# Patient Record
Sex: FEMALE | Race: BLACK OR AFRICAN AMERICAN | NOT HISPANIC OR LATINO | Employment: FULL TIME | URBAN - METROPOLITAN AREA
[De-identification: names, ages, dates, MRNs, and addresses within clinical notes are randomized per-mention and may not be internally consistent; named-entity substitution may affect disease eponyms.]

---

## 2023-03-22 LAB — URINE CULTURE: NORMAL

## 2023-10-01 PROBLEM — M25.559 HIP PAIN: Status: ACTIVE | Noted: 2023-10-01

## 2023-10-01 PROBLEM — O28.5 ABNORMAL CHROMOSOMAL AND GENETIC FINDING ON ANTENATAL SCREENING OF MOTHER: Status: ACTIVE | Noted: 2023-10-01

## 2023-10-01 PROBLEM — R06.02 SHORTNESS OF BREATH WITH PREGNANCY (HHS-HCC): Status: ACTIVE | Noted: 2023-10-01

## 2023-10-01 PROBLEM — R35.0 URINARY FREQUENCY: Status: ACTIVE | Noted: 2023-10-01

## 2023-10-01 PROBLEM — M54.9 BACK PAIN: Status: ACTIVE | Noted: 2023-10-01

## 2023-10-01 PROBLEM — N91.1 SECONDARY AMENORRHEA: Status: ACTIVE | Noted: 2023-10-01

## 2023-10-01 PROBLEM — K42.9 UMBILICAL HERNIA: Status: ACTIVE | Noted: 2023-10-01

## 2023-10-01 PROBLEM — Z98.891 STATUS POST CESAREAN SECTION: Status: ACTIVE | Noted: 2023-10-01

## 2023-10-01 PROBLEM — D64.9 ANEMIA: Status: ACTIVE | Noted: 2023-10-01

## 2023-10-01 PROBLEM — N89.8 VAGINAL LESION: Status: ACTIVE | Noted: 2023-10-01

## 2023-10-01 PROBLEM — O43.899: Status: ACTIVE | Noted: 2023-10-01

## 2023-10-01 PROBLEM — O34.29 UTERINE SCAR FROM PREVIOUS SURGERY AFFECTING PREGNANCY (HHS-HCC): Status: ACTIVE | Noted: 2023-10-01

## 2023-10-01 PROBLEM — R06.02 SOB (SHORTNESS OF BREATH) ON EXERTION: Status: ACTIVE | Noted: 2023-10-01

## 2023-10-01 PROBLEM — Z87.898 HISTORY OF DOMESTIC VIOLENCE: Status: ACTIVE | Noted: 2023-10-01

## 2023-10-01 PROBLEM — R45.89 DEPRESSED MOOD: Status: ACTIVE | Noted: 2023-10-01

## 2023-10-01 PROBLEM — R30.0 DYSURIA: Status: ACTIVE | Noted: 2023-10-01

## 2023-10-01 PROBLEM — N93.9 VAGINAL BLEEDING: Status: ACTIVE | Noted: 2023-10-01

## 2023-10-01 PROBLEM — O26.899 SHORTNESS OF BREATH WITH PREGNANCY (HHS-HCC): Status: ACTIVE | Noted: 2023-10-01

## 2023-10-01 PROBLEM — F41.9 ANXIETY: Status: ACTIVE | Noted: 2023-10-01

## 2023-10-01 PROBLEM — R80.9 PROTEINURIA: Status: ACTIVE | Noted: 2023-10-01

## 2023-10-01 PROBLEM — N90.89 VULVAR LESION: Status: ACTIVE | Noted: 2023-10-01

## 2023-10-01 PROBLEM — M54.16 LUMBAR RADICULOPATHY, RIGHT: Status: ACTIVE | Noted: 2023-10-01

## 2023-10-01 PROBLEM — N89.8 VAGINAL IRRITATION: Status: ACTIVE | Noted: 2023-10-01

## 2023-10-01 RX ORDER — ACETAMINOPHEN AND CODEINE PHOSPHATE 300; 15 MG/1; MG/1
1 TABLET ORAL EVERY 6 HOURS PRN
COMMUNITY
Start: 2021-02-12 | End: 2023-10-16

## 2023-10-01 RX ORDER — DESOGESTREL AND ETHINYL ESTRADIOL 21-5 (28)
1 KIT ORAL DAILY
COMMUNITY
Start: 2022-08-11 | End: 2023-10-16

## 2023-10-01 RX ORDER — NORETHINDRONE 0.35 MG/1
1 TABLET ORAL DAILY
COMMUNITY
Start: 2020-06-06 | End: 2023-10-16

## 2023-10-03 ENCOUNTER — TELEMEDICINE (OUTPATIENT)
Dept: BEHAVIORAL HEALTH | Facility: CLINIC | Age: 33
End: 2023-10-03
Payer: COMMERCIAL

## 2023-10-03 DIAGNOSIS — F43.0 REACTION, STRESS, ACUTE: ICD-10-CM

## 2023-10-03 DIAGNOSIS — F41.9 ANXIETY: ICD-10-CM

## 2023-10-03 PROCEDURE — 90847 FAMILY PSYTX W/PT 50 MIN: CPT | Performed by: COUNSELOR
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
## 2023-10-10 ENCOUNTER — APPOINTMENT (OUTPATIENT)
Dept: BEHAVIORAL HEALTH | Facility: CLINIC | Age: 33
End: 2023-10-10
Payer: COMMERCIAL

## 2023-10-16 ENCOUNTER — OFFICE VISIT (OUTPATIENT)
Dept: PRIMARY CARE | Facility: CLINIC | Age: 33
End: 2023-10-16
Payer: COMMERCIAL

## 2023-10-16 VITALS
HEART RATE: 81 BPM | SYSTOLIC BLOOD PRESSURE: 110 MMHG | HEIGHT: 67 IN | DIASTOLIC BLOOD PRESSURE: 66 MMHG | OXYGEN SATURATION: 99 % | BODY MASS INDEX: 19.71 KG/M2 | WEIGHT: 125.6 LBS

## 2023-10-16 DIAGNOSIS — F51.04 CHRONIC INSOMNIA: ICD-10-CM

## 2023-10-16 DIAGNOSIS — R07.89 ATYPICAL CHEST PAIN: Primary | ICD-10-CM

## 2023-10-16 PROCEDURE — 99204 OFFICE O/P NEW MOD 45 MIN: CPT | Performed by: STUDENT IN AN ORGANIZED HEALTH CARE EDUCATION/TRAINING PROGRAM

## 2023-10-16 PROCEDURE — 1036F TOBACCO NON-USER: CPT | Performed by: STUDENT IN AN ORGANIZED HEALTH CARE EDUCATION/TRAINING PROGRAM

## 2023-10-16 PROCEDURE — 93000 ELECTROCARDIOGRAM COMPLETE: CPT | Performed by: STUDENT IN AN ORGANIZED HEALTH CARE EDUCATION/TRAINING PROGRAM

## 2023-10-16 NOTE — PROGRESS NOTES
"Subjective   Patient ID: Jd Witt is a 33 y.o. female who presents for New Patient Visit (Chest pains. Pt declined flu shot. ).        HPI    Est care   Pt's PMH, PSH, SH, FH , meds and allergies was obtained / reviewed and updated .    Her maternal Gm and GM sis passed away recently and has been experiencing  more CP since then . CP even prior.   Tightness on right sided chest , lasts 1-3days   Not related to exertion  Nausea may or may not be present with CP       LMP 10/13   Not currently on any birth control   Dad reportedly  of heart attack at age 40 , aneurysm in his 20s   Sister - aneurysm in her 20s     Denies any recreational drug use  Has 3 children 7 y/o twin boys and 3 y/o daughter , father is partially involved   Works full time ,    Not a cigarette smoker  no alcohol use     Chronic sleep deprivation   Sleeps 3-4 hrs every night for the last 2 years   Some days she sleeps for 9 hrs like this past      In therapy once a week .     Visit Vitals  /66   Pulse 81   Ht 1.702 m (5' 7\")   Wt 57 kg (125 lb 9.6 oz)   LMP 10/13/2023   SpO2 99%   BMI 19.67 kg/m²   OB Status Having periods   Smoking Status Never   BSA 1.64 m²      Patient's last menstrual period was 10/13/2023.     Review of Systems    Constitutional : No feeling poorly / fevers/ chills / night sweats/ fatigue   Cardiovascular : No CP /Palpitations/ lower extremity edema / syncope   Respiratory : No Cough /MONTAÑO/Dyspnea at rest   Gastrointestinal : No abd pain / N/V  No bloody stools/ melena / constipation  Endo : No polyuria/polydipsia/ muscle weakness / sluggishness   CNS: No confusion / HA/ tingling/ numbness/ weakness of extremities  Psychiatric: No anxiety/ depression/ SI/HI    All other systems have been reviewed and are negative for complaint       Physical Exam    Constitutional : Vitals reviewed. Alert and in no distress  Cardiovascular : RRR, Normal S1, S2, No pericardial rub/ gallop, no peripheral " edema   Pulmonary: No respiratory distress, CTAB   MSK : Normal gait and station , strength and tone     Neurologic : CNs 2-12 grossly intact , no obvious FNDs  Psych : A,Ox3, normal mood and affect      Assessment/Plan   Diagnoses and all orders for this visit:  Atypical chest pain  -     ECG 12 lead (Clinic Performed)  -     CBC; Future  -     Comprehensive Metabolic Panel; Future  -     Hemoglobin A1C; Future  -     Lipid Panel; Future  -     TSH with reflex to Free T4 if abnormal; Future  Chronic insomnia  -     Referral to Sleep Psychology; Future       Atypical chest pain likely from acute stress in her life from everyday life   Possibility of GERD discussed as well   Cardiac risk factors none at this time   EKG ; NSR   Chronic insomnia : sleep Psychology   Life stressors : in therapy once a week   Not desiring medical mgmt at this time         Conditions addressed and mgmt as noted above.  Pertinent labs, images/ imaging reports , chart review was done .   Age appropriate labs / labs for mgmt of chronic medical conditions ordered, further mgmt pending the results.

## 2023-10-17 ENCOUNTER — APPOINTMENT (OUTPATIENT)
Dept: BEHAVIORAL HEALTH | Facility: CLINIC | Age: 33
End: 2023-10-17
Payer: COMMERCIAL

## 2023-10-17 ENCOUNTER — OFFICE VISIT (OUTPATIENT)
Dept: OBSTETRICS AND GYNECOLOGY | Facility: CLINIC | Age: 33
End: 2023-10-17
Payer: COMMERCIAL

## 2023-10-17 VITALS
BODY MASS INDEX: 19.62 KG/M2 | WEIGHT: 125 LBS | SYSTOLIC BLOOD PRESSURE: 112 MMHG | DIASTOLIC BLOOD PRESSURE: 71 MMHG | HEART RATE: 74 BPM | HEIGHT: 67 IN

## 2023-10-17 DIAGNOSIS — Z30.013 ENCOUNTER FOR INITIAL PRESCRIPTION OF INJECTABLE CONTRACEPTIVE: ICD-10-CM

## 2023-10-17 DIAGNOSIS — Z01.419 ENCNTR FOR GYN EXAM (GENERAL) (ROUTINE) W/O ABN FINDINGS: Primary | ICD-10-CM

## 2023-10-17 DIAGNOSIS — Z11.3 ROUTINE SCREENING FOR STI (SEXUALLY TRANSMITTED INFECTION): ICD-10-CM

## 2023-10-17 PROBLEM — O28.5 ABNORMAL CHROMOSOMAL AND GENETIC FINDING ON ANTENATAL SCREENING OF MOTHER: Status: RESOLVED | Noted: 2023-10-01 | Resolved: 2023-10-17

## 2023-10-17 PROBLEM — M25.559 HIP PAIN: Status: RESOLVED | Noted: 2023-10-01 | Resolved: 2023-10-17

## 2023-10-17 PROBLEM — N91.1 SECONDARY AMENORRHEA: Status: RESOLVED | Noted: 2023-10-01 | Resolved: 2023-10-17

## 2023-10-17 PROBLEM — N89.8 VAGINAL IRRITATION: Status: RESOLVED | Noted: 2023-10-01 | Resolved: 2023-10-17

## 2023-10-17 PROBLEM — R06.02 SOB (SHORTNESS OF BREATH) ON EXERTION: Status: RESOLVED | Noted: 2023-10-01 | Resolved: 2023-10-17

## 2023-10-17 PROBLEM — M54.9 BACK PAIN: Status: RESOLVED | Noted: 2023-10-01 | Resolved: 2023-10-17

## 2023-10-17 PROBLEM — R80.9 PROTEINURIA: Status: RESOLVED | Noted: 2023-10-01 | Resolved: 2023-10-17

## 2023-10-17 PROBLEM — O43.899: Status: RESOLVED | Noted: 2023-10-01 | Resolved: 2023-10-17

## 2023-10-17 PROBLEM — R30.0 DYSURIA: Status: RESOLVED | Noted: 2023-10-01 | Resolved: 2023-10-17

## 2023-10-17 PROBLEM — O34.29 UTERINE SCAR FROM PREVIOUS SURGERY AFFECTING PREGNANCY (HHS-HCC): Status: RESOLVED | Noted: 2023-10-01 | Resolved: 2023-10-17

## 2023-10-17 PROBLEM — Z98.891 STATUS POST CESAREAN SECTION: Status: RESOLVED | Noted: 2023-10-01 | Resolved: 2023-10-17

## 2023-10-17 PROBLEM — N93.9 VAGINAL BLEEDING: Status: RESOLVED | Noted: 2023-10-01 | Resolved: 2023-10-17

## 2023-10-17 PROBLEM — N89.8 VAGINAL LESION: Status: RESOLVED | Noted: 2023-10-01 | Resolved: 2023-10-17

## 2023-10-17 PROBLEM — N90.89 VULVAR LESION: Status: RESOLVED | Noted: 2023-10-01 | Resolved: 2023-10-17

## 2023-10-17 PROBLEM — M54.16 LUMBAR RADICULOPATHY, RIGHT: Status: RESOLVED | Noted: 2023-10-01 | Resolved: 2023-10-17

## 2023-10-17 PROBLEM — R06.02 SHORTNESS OF BREATH WITH PREGNANCY (HHS-HCC): Status: RESOLVED | Noted: 2023-10-01 | Resolved: 2023-10-17

## 2023-10-17 PROBLEM — R35.0 URINARY FREQUENCY: Status: RESOLVED | Noted: 2023-10-01 | Resolved: 2023-10-17

## 2023-10-17 PROBLEM — K42.9 UMBILICAL HERNIA: Status: RESOLVED | Noted: 2023-10-01 | Resolved: 2023-10-17

## 2023-10-17 PROBLEM — O26.899 SHORTNESS OF BREATH WITH PREGNANCY (HHS-HCC): Status: RESOLVED | Noted: 2023-10-01 | Resolved: 2023-10-17

## 2023-10-17 LAB — PREGNANCY TEST URINE, POC: NEGATIVE

## 2023-10-17 PROCEDURE — 81025 URINE PREGNANCY TEST: CPT | Performed by: NURSE PRACTITIONER

## 2023-10-17 PROCEDURE — 99385 PREV VISIT NEW AGE 18-39: CPT | Performed by: NURSE PRACTITIONER

## 2023-10-17 PROCEDURE — 1036F TOBACCO NON-USER: CPT | Performed by: NURSE PRACTITIONER

## 2023-10-17 PROCEDURE — 2500000004 HC RX 250 GENERAL PHARMACY W/ HCPCS (ALT 636 FOR OP/ED): Performed by: NURSE PRACTITIONER

## 2023-10-17 PROCEDURE — 99395 PREV VISIT EST AGE 18-39: CPT | Performed by: NURSE PRACTITIONER

## 2023-10-17 RX ORDER — MEDROXYPROGESTERONE ACETATE 150 MG/ML
150 INJECTION, SUSPENSION INTRAMUSCULAR
Status: SHIPPED | OUTPATIENT
Start: 2023-10-17 | End: 2024-10-11

## 2023-10-17 RX ADMIN — MEDROXYPROGESTERONE ACETATE 150 MG: 150 INJECTION, SUSPENSION INTRAMUSCULAR at 14:59

## 2023-10-17 ASSESSMENT — ENCOUNTER SYMPTOMS
ALLERGIC/IMMUNOLOGIC NEGATIVE: 0
CONSTITUTIONAL NEGATIVE: 1
NEUROLOGICAL NEGATIVE: 0
LOSS OF SENSATION IN FEET: 0
RESPIRATORY NEGATIVE: 0
CARDIOVASCULAR NEGATIVE: 0
EYES NEGATIVE: 1
PSYCHIATRIC NEGATIVE: 1
OCCASIONAL FEELINGS OF UNSTEADINESS: 0
ENDOCRINE NEGATIVE: 0
HEMATOLOGIC/LYMPHATIC NEGATIVE: 0
GASTROINTESTINAL NEGATIVE: 0
MUSCULOSKELETAL NEGATIVE: 0
DEPRESSION: 0

## 2023-10-17 ASSESSMENT — COLUMBIA-SUICIDE SEVERITY RATING SCALE - C-SSRS
1. IN THE PAST MONTH, HAVE YOU WISHED YOU WERE DEAD OR WISHED YOU COULD GO TO SLEEP AND NOT WAKE UP?: NO
2. HAVE YOU ACTUALLY HAD ANY THOUGHTS OF KILLING YOURSELF?: NO
6. HAVE YOU EVER DONE ANYTHING, STARTED TO DO ANYTHING, OR PREPARED TO DO ANYTHING TO END YOUR LIFE?: NO

## 2023-10-17 ASSESSMENT — PATIENT HEALTH QUESTIONNAIRE - PHQ9
2. FEELING DOWN, DEPRESSED OR HOPELESS: NOT AT ALL
1. LITTLE INTEREST OR PLEASURE IN DOING THINGS: SEVERAL DAYS
SUM OF ALL RESPONSES TO PHQ9 QUESTIONS 1 AND 2: 1

## 2023-10-17 ASSESSMENT — PAIN SCALES - GENERAL: PAINLEVEL: 0-NO PAIN

## 2023-10-17 NOTE — PROGRESS NOTES
"Jd Witt is a 33 y.o. who presents today for her annual gynecologic exam with complaints needing contraception    Concerns with intercourse:  no     Last pap:   2019 Normal HPV Negative  History of abnormal pap: no  HPV vaccine: no  Last mammogram: Never  Colon screen: Never    History of STIs: yes - trich     Patient concern for STI: yes    Family history of breast, uterine, ovarian or colon cancer: no     Past medical, surgical, family and social histories reviewed and updated as needed.    /71   Pulse 74   Ht 1.702 m (5' 7\")   Wt 56.7 kg (125 lb)   LMP 10/14/2023   BMI 19.58 kg/m²      OBGyn Exam       Diagnoses and all orders for this visit:  Encntr for gyn exam (general) (routine) w/o abn findings  Routine screening for STI (sexually transmitted infection)  -     Hepatitis B Surface Antigen; Future  -     Hepatitis C Antibody; Future  -     C. Trachomatis / N. Gonorrhoeae, Amplified Detection  -     TRICH VAGINALIS, AMPLIFIED  -     HIV 1/2 Antigen/Antibody Screen with Reflex to Confirmation; Future  -     Syphilis Screen with Reflex; Future  Encounter for initial prescription of injectable contraceptive  -     POC pregnancy, urine  -     medroxyPROGESTERone (Depo-Provera) injection 150 mg    "

## 2023-10-19 ENCOUNTER — TELEPHONE (OUTPATIENT)
Dept: OBSTETRICS AND GYNECOLOGY | Facility: CLINIC | Age: 33
End: 2023-10-19
Payer: COMMERCIAL

## 2023-10-19 DIAGNOSIS — Z11.3 SCREENING FOR STDS (SEXUALLY TRANSMITTED DISEASES): Primary | ICD-10-CM

## 2023-10-19 NOTE — TELEPHONE ENCOUNTER
Left patient a message. Needs to repeat GC/CT and trich. Lab cancelled the orders, pended orders sent to Nuris Camargo CNP to sign.

## 2023-10-24 ENCOUNTER — APPOINTMENT (OUTPATIENT)
Dept: BEHAVIORAL HEALTH | Facility: CLINIC | Age: 33
End: 2023-10-24
Payer: COMMERCIAL

## 2023-10-31 ENCOUNTER — TELEMEDICINE (OUTPATIENT)
Dept: BEHAVIORAL HEALTH | Facility: CLINIC | Age: 33
End: 2023-10-31
Payer: COMMERCIAL

## 2023-10-31 DIAGNOSIS — F51.04 CHRONIC INSOMNIA: ICD-10-CM

## 2024-01-08 ENCOUNTER — CLINICAL SUPPORT (OUTPATIENT)
Dept: OBSTETRICS AND GYNECOLOGY | Facility: CLINIC | Age: 34
End: 2024-01-08
Payer: MEDICARE

## 2024-01-08 DIAGNOSIS — Z30.019 ENCOUNTER FOR FEMALE BIRTH CONTROL: ICD-10-CM

## 2024-01-08 PROCEDURE — 96372 THER/PROPH/DIAG INJ SC/IM: CPT | Performed by: NURSE PRACTITIONER

## 2024-01-08 PROCEDURE — 2500000004 HC RX 250 GENERAL PHARMACY W/ HCPCS (ALT 636 FOR OP/ED): Performed by: NURSE PRACTITIONER

## 2024-01-08 RX ADMIN — MEDROXYPROGESTERONE ACETATE 150 MG: 150 INJECTION, SUSPENSION INTRAMUSCULAR at 10:15

## 2024-01-08 NOTE — PROGRESS NOTES
Patient here for her second deprovera injection, 150mg given IM to right gluteal region, tolerated well. Given calendar for her next 12 week injection.  Patient having bleeding. Message sent to Dr. Guzmán.

## 2024-01-23 ENCOUNTER — TELEMEDICINE (OUTPATIENT)
Dept: BEHAVIORAL HEALTH | Facility: CLINIC | Age: 34
End: 2024-01-23
Payer: MEDICARE

## 2024-01-23 DIAGNOSIS — F41.9 ANXIETY: ICD-10-CM

## 2024-01-23 PROCEDURE — 90834 PSYTX W PT 45 MINUTES: CPT | Performed by: COUNSELOR

## 2024-01-23 NOTE — PROGRESS NOTES
"Start time: 3:10 pm  End time: 4:00 pm  Total time:50 minutes  Telehealth visit. PT consented  Last visit: 10/3/2024  Diagnosis: F41.9  PT returns to therapy reporting feeling \"good, accomplished.\"  She and her 3 children successfully moved into their new home on 10/19. She is starting to paint and remodel and smiled as she shared her pride/love for her home. Said she is teaching her children to be more responsible, \"its still stressful.\"  Provider joined, celebrated success.  We processed reported stressors: 1) relationship with Ady.  2) Holidays were difficult without her grandmother. 3)  care taking the children's grandmother who was diagnosed with cancer. 4) Work stress. Her performance evaluation rating was 2 (below average).  Provider offered support. Used CBT and supportive therapy. PT evidenced improved ability to identify her thinking errors as well as to recognize where she can problem solve or let go. She struggles with perspective taking and empathy, we will continue to explore. We will discuss treatment goal setting for 2024 in the upcoming visit. F/U 2 -3 weeks.    "

## 2024-01-25 ENCOUNTER — OFFICE VISIT (OUTPATIENT)
Dept: SLEEP MEDICINE | Facility: CLINIC | Age: 34
End: 2024-01-25
Payer: MEDICARE

## 2024-01-25 DIAGNOSIS — G47.00 INSOMNIA, UNSPECIFIED TYPE: Primary | ICD-10-CM

## 2024-01-25 DIAGNOSIS — F41.9 ANXIETY: ICD-10-CM

## 2024-01-25 DIAGNOSIS — Z56.6 WORK-RELATED STRESS: ICD-10-CM

## 2024-01-25 DIAGNOSIS — Z65.8 PSYCHOSOCIAL STRESSORS: ICD-10-CM

## 2024-01-25 DIAGNOSIS — Z72.821 INADEQUATE SLEEP HYGIENE: ICD-10-CM

## 2024-01-25 DIAGNOSIS — R45.89 DEPRESSED MOOD: ICD-10-CM

## 2024-01-25 DIAGNOSIS — G47.19 EXCESSIVE DAYTIME SLEEPINESS: ICD-10-CM

## 2024-01-25 DIAGNOSIS — G47.9 SLEEP DISTURBANCE: ICD-10-CM

## 2024-01-25 DIAGNOSIS — R53.83 FATIGUE, UNSPECIFIED TYPE: ICD-10-CM

## 2024-01-25 PROCEDURE — 1036F TOBACCO NON-USER: CPT

## 2024-01-25 PROCEDURE — 99204 OFFICE O/P NEW MOD 45 MIN: CPT

## 2024-01-25 SDOH — HEALTH STABILITY - MENTAL HEALTH: OTHER PHYSICAL AND MENTAL STRAIN RELATED TO WORK: Z56.6
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
## 2024-01-25 NOTE — LETTER
February 3, 2024     Sunita Young MD  [address]  [facility] [address]    Patient: Jd Witt   YOB: 1990   Date of Visit: 1/25/2024       Dear Dr. Sunita Young MD:    Thank you for referring Jd Witt to me for evaluation. Below are my notes for this consultation.  If you have questions, please do not hesitate to call me. I look forward to following your patient along with you.       Sincerely,     Jaclyn Liriano, APRN-CNP      CC: No Recipients  ______________________________________________________________________________________

## 2024-02-03 PROBLEM — Z65.8 PSYCHOSOCIAL STRESSORS: Status: ACTIVE | Noted: 2024-02-03

## 2024-02-03 PROBLEM — Z56.6 WORK-RELATED STRESS: Status: ACTIVE | Noted: 2024-02-03

## 2024-02-03 PROBLEM — Z72.821 INADEQUATE SLEEP HYGIENE: Status: ACTIVE | Noted: 2024-02-03

## 2024-02-03 PROBLEM — G47.9 SLEEP DISTURBANCE: Status: ACTIVE | Noted: 2024-02-03

## 2024-02-03 PROBLEM — G47.19 EXCESSIVE DAYTIME SLEEPINESS: Status: ACTIVE | Noted: 2024-02-03

## 2024-02-03 PROBLEM — R53.83 FATIGUE: Status: ACTIVE | Noted: 2024-02-03

## 2024-02-03 PROBLEM — G47.00 INSOMNIA: Status: ACTIVE | Noted: 2024-02-03

## 2024-02-04 NOTE — PATIENT INSTRUCTIONS
GOALS/OBJECTIVES/INTERVENTIONS:  1. Patient was provided psychoeducation on Cognitive Behavioral Therapy for insomnia including sleep restriction, sleep hygiene, stimulus control, and challenging cognitions related to sleep.   2. Patient was instructed to keep sleep logs, also tracking medication use and any additional factors that may have positively or negatively impacted sleep. Patient will keep sleep logs for the next 2 weeks to gain a better understanding of the exact nature of her sleep issues and further tailor CBT-I treatment to those concerns.   3. Continue with Psychologist / Therapist  4. Initial Sleep Plan include:            - Relaxation practice in the last hour before bed            - make a cut off time in the evening for work, no working late into the night  5. Patient scheduled for follow up in two weeks  6. Please email your sleep logs the DAY PRIOR to your appointment to [email or web address]    If sleep logs are not completed and submitted as requested, you will be asked to reschedule to another date and will need to ensure the sleep logs are submitted the day prior to your appointment. After two reschedules, you will be discontinued out of therapy.

## 2024-02-12 ENCOUNTER — TELEMEDICINE (OUTPATIENT)
Dept: BEHAVIORAL HEALTH | Facility: CLINIC | Age: 34
End: 2024-02-12
Payer: MEDICARE

## 2024-02-12 DIAGNOSIS — F33.1 MAJOR DEPRESSIVE DISORDER, RECURRENT EPISODE, MODERATE WITH ANXIOUS DISTRESS (MULTI): ICD-10-CM

## 2024-02-12 DIAGNOSIS — F41.9 ANXIETY: ICD-10-CM

## 2024-02-12 PROCEDURE — 90837 PSYTX W PT 60 MINUTES: CPT | Performed by: COUNSELOR

## 2024-02-12 PROCEDURE — 1036F TOBACCO NON-USER: CPT | Performed by: COUNSELOR
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
## 2024-02-27 ENCOUNTER — TELEMEDICINE (OUTPATIENT)
Dept: BEHAVIORAL HEALTH | Facility: CLINIC | Age: 34
End: 2024-02-27
Payer: MEDICARE

## 2024-02-27 DIAGNOSIS — F43.89 REACTION TO CHRONIC STRESS: ICD-10-CM

## 2024-02-27 DIAGNOSIS — F33.0 MILD RECURRENT MAJOR DEPRESSION (CMS-HCC): ICD-10-CM

## 2024-02-27 DIAGNOSIS — F41.9 ANXIETY: ICD-10-CM

## 2024-02-27 PROCEDURE — 90834 PSYTX W PT 45 MINUTES: CPT | Performed by: COUNSELOR

## 2024-02-27 PROCEDURE — 1036F TOBACCO NON-USER: CPT | Performed by: COUNSELOR
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
## 2024-03-05 ENCOUNTER — TELEMEDICINE (OUTPATIENT)
Dept: BEHAVIORAL HEALTH | Facility: CLINIC | Age: 34
End: 2024-03-05
Payer: MEDICARE

## 2024-03-05 DIAGNOSIS — F41.9 ANXIETY: ICD-10-CM

## 2024-03-05 DIAGNOSIS — F43.89 STRESS REACTION, CHRONIC: ICD-10-CM

## 2024-03-05 DIAGNOSIS — F33.0 MILD RECURRENT MAJOR DEPRESSION (CMS-HCC): ICD-10-CM

## 2024-03-05 PROCEDURE — 90837 PSYTX W PT 60 MINUTES: CPT | Performed by: COUNSELOR

## 2024-03-05 PROCEDURE — 1036F TOBACCO NON-USER: CPT | Performed by: COUNSELOR

## 2024-03-05 NOTE — PROGRESS NOTES
"Start time:  1:00 pm  End time: 2:00 pm  Total time: 60 minutes  Telehealth visit. PT consented  Last visit: 2/27/2024  Diagnosis: MDD, Anxiety, Chronic stress  Treatment goal: \"I want to be in peace and a gain a better handle on my emotions.\"    PT presented as calm. When provider asked what changed from last week pt said she got a break this weekend, her mother came into [facility] for her while she went to [place].  She rated the trip 8/10 (10 the best) noting moments of feeling very stressed and anxious, \"girlfriends' behavior was over the top.\"   She was triggered by several conversations, felt boundaries were crossed.   She wants to share her concerns with her friend but does not want to be disrespectful.  Jd was tearful as she shared happy and grateful feelings that her friends recognized her progress. T/A school issue involving her son. She notes being more mindful to self regulate to avoid reacting and although unresolved she thinks she is doing okay managing her thoughts/emotions concerning this situation.  Provider praised for taking the time to get away and have fun. Reflected on managing triggers and assessed her ability to identify thinking errors using CBT.  Engaged to teach more conflict resolution using I statements and active listening skills.    HW-  -continue to notice/track triggers   -dedicate 30 minutes daily to self care  -spend 5 minutes daily doing nothing  "

## 2024-07-19 ENCOUNTER — APPOINTMENT (OUTPATIENT)
Dept: OBSTETRICS AND GYNECOLOGY | Facility: CLINIC | Age: 34
End: 2024-07-19
Payer: MEDICARE

## 2024-10-29 DIAGNOSIS — Z32.01 PREGNANCY TEST POSITIVE (HHS-HCC): Primary | ICD-10-CM

## 2024-10-29 NOTE — PROGRESS NOTES
Please see MyChart message from today.  Early OB, scheduled for initial OB 12/3.    Requested Prescriptions     Pending Prescriptions Disp Refills    prenatal vitamin, iron-folic, 27 mg iron-800 mcg folic acid tablet 90 tablet 3     Sig: Take 1 tablet by mouth once daily.

## 2024-11-07 ENCOUNTER — OFFICE VISIT (OUTPATIENT)
Dept: OBSTETRICS AND GYNECOLOGY | Facility: CLINIC | Age: 34
End: 2024-11-07
Payer: MEDICARE

## 2024-11-07 VITALS — HEIGHT: 67 IN | BODY MASS INDEX: 21.35 KG/M2 | WEIGHT: 136 LBS

## 2024-11-07 DIAGNOSIS — O26.899 PELVIC PAIN AFFECTING PREGNANCY, ANTEPARTUM (HHS-HCC): Primary | ICD-10-CM

## 2024-11-07 DIAGNOSIS — R10.2 PELVIC PAIN AFFECTING PREGNANCY, ANTEPARTUM (HHS-HCC): Primary | ICD-10-CM

## 2024-11-07 DIAGNOSIS — N91.2 AMENORRHEA: ICD-10-CM

## 2024-11-07 DIAGNOSIS — R35.0 FREQUENT URINATION: ICD-10-CM

## 2024-11-07 LAB
BILIRUBIN, POC: NEGATIVE
BLOOD URINE, POC: NEGATIVE
CLARITY, POC: NORMAL
COLOR, POC: YELLOW
GLUCOSE URINE, POC: NEGATIVE
KETONES, POC: NEGATIVE
LEUKOCYTE EST, POC: NEGATIVE
NITRITE, POC: NEGATIVE
PH, POC: 6
PREGNANCY TEST URINE, POC: POSITIVE
SPECIFIC GRAVITY, POC: 1.01
URINE PROTEIN, POC: NEGATIVE
UROBILINOGEN, POC: 0.2

## 2024-11-07 PROCEDURE — 81002 URINALYSIS NONAUTO W/O SCOPE: CPT | Performed by: OBSTETRICS & GYNECOLOGY

## 2024-11-07 PROCEDURE — 99214 OFFICE O/P EST MOD 30 MIN: CPT | Performed by: OBSTETRICS & GYNECOLOGY

## 2024-11-07 PROCEDURE — 1036F TOBACCO NON-USER: CPT | Performed by: OBSTETRICS & GYNECOLOGY

## 2024-11-07 PROCEDURE — 81025 URINE PREGNANCY TEST: CPT | Performed by: OBSTETRICS & GYNECOLOGY

## 2024-11-07 PROCEDURE — 87086 URINE CULTURE/COLONY COUNT: CPT

## 2024-11-07 PROCEDURE — 3008F BODY MASS INDEX DOCD: CPT | Performed by: OBSTETRICS & GYNECOLOGY

## 2024-11-07 NOTE — PROGRESS NOTES
Jd Witt is a 34 y.o. female who presents with a chief complaint of Pelvic Pain (Patient complains she is having abnormal pelvic pain, no bleeding  and lower back pain./Patient had 2 positive pregnancy test at home , initial ob appointment is with  but is here today for possible uti and pelvic pain.)      SUBJECTIVE  Patient presents in early pregnancy complaining of pelvic pain and cramping.  She has a history of fibroids in the past.  She has had 2 another pregnancy with twins.  She denies any bleeding but is having a lot of pain and cramping.  She is about 6 weeks pregnant by her LMP.  We talked discussed getting an ultrasound just to check for an intrauterine pregnancy.    Past Medical History:   Diagnosis Date    Anemia complicating pregnancy, unspecified trimester ([facility]) 02/06/2017    Anemia in pregnancy    Encounter for contraceptive management, unspecified 09/18/2019    Contraception management    Encounter for screening for infections with a predominantly sexual mode of transmission 06/29/2016    Screening for STDs (sexually transmitted diseases)    Encounter for screening for infections with a predominantly sexual mode of transmission 10/29/2019    Screening for STDs (sexually transmitted diseases)    Encounter for supervision of normal first pregnancy, unspecified trimester ([facility]) 11/11/2016    Supervision of normal first pregnancy    Gestational (pregnancy-induced) hypertension without significant proteinuria, third trimester ([facility]) 02/03/2017    Gestational hypertension, third trimester    Gestational (pregnancy-induced) hypertension without significant proteinuria, unspecified trimester ([facility]) 02/06/2017    Gestational hypertension    Gestational (pregnancy-induced) hypertension without significant proteinuria, unspecified trimester ([facility]) 02/06/2017    Gestational hypertension    Other specified diseases and conditions complicating pregnancy ([facility]) 02/06/2017     Back pain during pregnancy    Personal history of other diseases of the female genital tract 2019    History of vaginal discharge    Personal history of other drug therapy 10/29/2019    History of influenza vaccination    Personal history of other specified conditions 10/29/2019    History of urinary frequency    Status post  section 10/01/2023    Twin pregnancy, dichorionic/diamniotic, unspecified trimester ([facility]) 2017    Twin pregnancy, twins dichorionic and diamniotic    Umbilical hernia 10/01/2023     Past Surgical History:   Procedure Laterality Date     SECTION, LOW TRANSVERSE  2017     Section Low Transverse     Social History     Socioeconomic History    Marital status: Single   Tobacco Use    Smoking status: Never    Smokeless tobacco: Never   Vaping Use    Vaping status: Never Used   Substance and Sexual Activity    Alcohol use: Not Currently    Drug use: Not Currently    Sexual activity: Yes     Partners: Male     Birth control/protection: Condom Male     Family History   Problem Relation Name Age of Onset    Other (cardiac arrhythmia) Father      Cerebral aneurysm Father      Cerebral aneurysm Sister      Diabetes Maternal Grandmother      Lung cancer Maternal Grandmother         OB History    Para Term  AB Living   2 2 2     3   SAB IAB Ectopic Multiple Live Births         1 3      # Outcome Date GA Lbr Fernie/2nd Weight Sex Type Anes PTL Lv   2 Term 20     Vag-Spont   GRACIELA   1A Term 17    M CS-LTranv   GRACIELA   1B Term 17    M CS-LVertical   GRACIELA       OBJECTIVE  No Known Allergies   (Not in a hospital admission)       Review of Systems  History obtained from the patient  General ROS: negative  Psychological ROS: negative  Gastrointestinal ROS: no abdominal pain, change in bowel habits, or black or bloody stools  Musculoskeletal ROS: negative  Physical Exam  General Appearance: awake, alert, oriented, in no acute distress, well  "developed, well nourished, and in no acute distress  Skin: there are no suspicious lesions or rashes of concern, skin color, texture, turgor are normal; there are no bruises, rashes or lesions.  Head/Face: NCAT  Eyes: No gross abnormalities., PERRL, and EOMI  Neck: neck- supple, no mass, non-tender  Back: no pain to palpation  Abdomen: Soft, non-tender, normal bowel sounds; no bruits, organomegaly or masses.  Extremities: Extremities warm to touch, pink, with no edema.  Musculoskeletal: negative    Ht 1.702 m (5' 7\")   Wt 61.7 kg (136 lb)   LMP 09/26/2024 (Approximate)   BMI 21.30 kg/m²    Problem List Items Addressed This Visit    None  Visit Diagnoses       Pelvic pain affecting pregnancy, antepartum ([facility])    -  Primary    Relevant Orders    US MAC OB imaging order    Frequent urination        Relevant Orders    POCT urinalysis dipstick manually resulted (Completed)    US MAC OB imaging order    Urine culture    Amenorrhea        Relevant Orders    POCT pregnancy, urine manually resulted (Completed)               "

## 2024-11-08 LAB — BACTERIA UR CULT: NORMAL

## 2024-11-11 ENCOUNTER — HOSPITAL ENCOUNTER (OUTPATIENT)
Dept: RADIOLOGY | Facility: CLINIC | Age: 34
Discharge: HOME | End: 2024-11-11
Payer: MEDICARE

## 2024-11-11 DIAGNOSIS — R10.2 PELVIC PAIN AFFECTING PREGNANCY, ANTEPARTUM (HHS-HCC): ICD-10-CM

## 2024-11-11 DIAGNOSIS — R35.0 FREQUENT URINATION: ICD-10-CM

## 2024-11-11 DIAGNOSIS — O26.899 PELVIC PAIN AFFECTING PREGNANCY, ANTEPARTUM (HHS-HCC): ICD-10-CM

## 2024-11-11 PROCEDURE — 76801 OB US < 14 WKS SINGLE FETUS: CPT

## 2024-11-11 PROCEDURE — 76817 TRANSVAGINAL US OBSTETRIC: CPT

## 2024-11-11 PROCEDURE — 76817 TRANSVAGINAL US OBSTETRIC: CPT | Performed by: OBSTETRICS & GYNECOLOGY

## 2024-11-11 PROCEDURE — 76801 OB US < 14 WKS SINGLE FETUS: CPT | Performed by: OBSTETRICS & GYNECOLOGY

## 2024-12-03 ENCOUNTER — APPOINTMENT (OUTPATIENT)
Dept: OBSTETRICS AND GYNECOLOGY | Facility: CLINIC | Age: 34
End: 2024-12-03
Payer: MEDICARE

## 2024-12-03 VITALS — WEIGHT: 139 LBS | SYSTOLIC BLOOD PRESSURE: 116 MMHG | BODY MASS INDEX: 21.77 KG/M2 | DIASTOLIC BLOOD PRESSURE: 60 MMHG

## 2024-12-03 DIAGNOSIS — Z98.891 HISTORY OF VBAC: ICD-10-CM

## 2024-12-03 DIAGNOSIS — Z32.01 PREGNANCY TEST POSITIVE (HHS-HCC): ICD-10-CM

## 2024-12-03 DIAGNOSIS — R61 SWEATING INCREASE: ICD-10-CM

## 2024-12-03 DIAGNOSIS — F41.9 ANXIETY AND DEPRESSION: ICD-10-CM

## 2024-12-03 DIAGNOSIS — N89.8 VAGINAL DISCHARGE: ICD-10-CM

## 2024-12-03 DIAGNOSIS — O09.521 HIGH RISK PREGNANCY, MULTIGRAVIDA OF ADVANCED MATERNAL AGE IN FIRST TRIMESTER (HHS-HCC): Primary | ICD-10-CM

## 2024-12-03 DIAGNOSIS — F32.A ANXIETY AND DEPRESSION: ICD-10-CM

## 2024-12-03 DIAGNOSIS — O21.9 NAUSEA AND VOMITING IN PREGNANCY: ICD-10-CM

## 2024-12-03 DIAGNOSIS — L75.0 BODY ODOR: ICD-10-CM

## 2024-12-03 LAB — PREGNANCY TEST URINE, POC: POSITIVE

## 2024-12-03 PROCEDURE — 87591 N.GONORRHOEAE DNA AMP PROB: CPT

## 2024-12-03 PROCEDURE — 88175 CYTOPATH C/V AUTO FLUID REDO: CPT

## 2024-12-03 PROCEDURE — 87624 HPV HI-RISK TYP POOLED RSLT: CPT

## 2024-12-03 PROCEDURE — 87661 TRICHOMONAS VAGINALIS AMPLIF: CPT

## 2024-12-03 PROCEDURE — 87205 SMEAR GRAM STAIN: CPT

## 2024-12-03 PROCEDURE — 87491 CHLMYD TRACH DNA AMP PROBE: CPT

## 2024-12-03 PROCEDURE — 81025 URINE PREGNANCY TEST: CPT | Performed by: OBSTETRICS & GYNECOLOGY

## 2024-12-03 PROCEDURE — 0500F INITIAL PRENATAL CARE VISIT: CPT | Performed by: OBSTETRICS & GYNECOLOGY

## 2024-12-03 RX ORDER — PYRIDOXINE HCL (VITAMIN B6) 25 MG
25 TABLET ORAL EVERY 8 HOURS
Qty: 90 TABLET | Refills: 1 | Status: SHIPPED | OUTPATIENT
Start: 2024-12-03 | End: 2025-03-03

## 2024-12-03 RX ORDER — ASPIRIN 81 MG/1
162 TABLET ORAL DAILY
Qty: 180 TABLET | Refills: 3 | Status: SHIPPED | OUTPATIENT
Start: 2024-12-03 | End: 2025-12-03

## 2024-12-03 ASSESSMENT — EDINBURGH POSTNATAL DEPRESSION SCALE (EPDS)
I HAVE BEEN ABLE TO LAUGH AND SEE THE FUNNY SIDE OF THINGS: NOT QUITE SO MUCH NOW
I HAVE FELT SAD OR MISERABLE: YES, QUITE OFTEN
I HAVE BEEN SO UNHAPPY THAT I HAVE HAD DIFFICULTY SLEEPING: YES, SOMETIMES
I HAVE BLAMED MYSELF UNNECESSARILY WHEN THINGS WENT WRONG: NOT VERY OFTEN
I HAVE BEEN ANXIOUS OR WORRIED FOR NO GOOD REASON: YES, VERY OFTEN
THINGS HAVE BEEN GETTING ON TOP OF ME: NO, MOST OF THE TIME I HAVE COPED QUITE WELL
I HAVE BEEN SO UNHAPPY THAT I HAVE BEEN CRYING: ONLY OCCASIONALLY
THE THOUGHT OF HARMING MYSELF HAS OCCURRED TO ME: NEVER
I HAVE FELT SCARED OR PANICKY FOR NO GOOD REASON: NO, NOT AT ALL
I HAVE LOOKED FORWARD WITH ENJOYMENT TO THINGS: RATHER LESS THAN I USED TO
TOTAL SCORE: 12
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
## 2024-12-03 NOTE — PATIENT INSTRUCTIONS
Congratulations on your pregnancy!      A Pap smear was sent today.  Results should be available in the next few weeks.      Review the information provided in the new pregnancy pack today.      Follow-up with one of our Genetics Counselors.      Follow-up with Psychiatry to discuss anxiety and depression during pregnancy.      Return to the office every 4 weeks in the beginning parts of pregnancy.  Your visits with close together as the pregnancy progresses.      Arrange to have your routine prenatal labs drawn today.      Arrange to have a follow-up ultrasound performed in the next few weeks.      Feel free to call the office with any problems, questions or concerns prior to next scheduled visit.      A prescription for baby aspirin has been sent to your pharmacy.      Take 2 tablets daily to decrease the risk of high blood pressure later on in pregnancy/preeclampsia in pregnancy.      A prescription for prenatal vitamins has been sent to your pharmacy.  Continue take 1 tablet daily.    Follow up with Dermatology about your sweating and body odor.

## 2024-12-04 LAB
C TRACH RRNA SPEC QL NAA+PROBE: NEGATIVE
CLUE CELLS VAG LPF-#/AREA: PRESENT /[LPF]
N GONORRHOEA DNA SPEC QL PROBE+SIG AMP: NEGATIVE
NUGENT SCORE: 8
T VAGINALIS RRNA SPEC QL NAA+PROBE: NEGATIVE
YEAST VAG WET PREP-#/AREA: ABNORMAL

## 2024-12-05 DIAGNOSIS — N76.0 BV (BACTERIAL VAGINOSIS): ICD-10-CM

## 2024-12-05 DIAGNOSIS — B96.89 BV (BACTERIAL VAGINOSIS): ICD-10-CM

## 2024-12-05 RX ORDER — METRONIDAZOLE 7.5 MG/G
GEL VAGINAL DAILY
Qty: 70 G | Refills: 0 | Status: SHIPPED | OUTPATIENT
Start: 2024-12-05 | End: 2024-12-10

## 2024-12-10 ENCOUNTER — LAB (OUTPATIENT)
Dept: LAB | Facility: LAB | Age: 34
End: 2024-12-10
Payer: MEDICARE

## 2024-12-10 ENCOUNTER — TELEPHONE (OUTPATIENT)
Dept: OBSTETRICS AND GYNECOLOGY | Facility: CLINIC | Age: 34
End: 2024-12-10

## 2024-12-10 DIAGNOSIS — O09.521 HIGH RISK PREGNANCY, MULTIGRAVIDA OF ADVANCED MATERNAL AGE IN FIRST TRIMESTER (HHS-HCC): ICD-10-CM

## 2024-12-10 LAB
ALBUMIN SERPL BCP-MCNC: 4.5 G/DL (ref 3.4–5)
ALP SERPL-CCNC: 55 U/L (ref 33–110)
ALT SERPL W P-5'-P-CCNC: 9 U/L (ref 7–45)
ANION GAP SERPL CALC-SCNC: 14 MMOL/L (ref 10–20)
AST SERPL W P-5'-P-CCNC: 13 U/L (ref 9–39)
BILIRUB SERPL-MCNC: 0.8 MG/DL (ref 0–1.2)
BUN SERPL-MCNC: 11 MG/DL (ref 6–23)
CALCIUM SERPL-MCNC: 9.7 MG/DL (ref 8.6–10.6)
CHLORIDE SERPL-SCNC: 101 MMOL/L (ref 98–107)
CO2 SERPL-SCNC: 26 MMOL/L (ref 21–32)
CREAT SERPL-MCNC: 0.54 MG/DL (ref 0.5–1.05)
EGFRCR SERPLBLD CKD-EPI 2021: >90 ML/MIN/1.73M*2
ERYTHROCYTE [DISTWIDTH] IN BLOOD BY AUTOMATED COUNT: 12.8 % (ref 11.5–14.5)
EST. AVERAGE GLUCOSE BLD GHB EST-MCNC: 100 MG/DL
GLUCOSE SERPL-MCNC: 85 MG/DL (ref 74–99)
HBA1C MFR BLD: 5.1 %
HBV SURFACE AG SERPL QL IA: NONREACTIVE
HCT VFR BLD AUTO: 35.6 % (ref 36–46)
HCV AB SER QL: NONREACTIVE
HGB BLD-MCNC: 11.7 G/DL (ref 12–16)
HIV 1+2 AB+HIV1 P24 AG SERPL QL IA: NONREACTIVE
MCH RBC QN AUTO: 28.7 PG (ref 26–34)
MCHC RBC AUTO-ENTMCNC: 32.9 G/DL (ref 32–36)
MCV RBC AUTO: 88 FL (ref 80–100)
NRBC BLD-RTO: 0 /100 WBCS (ref 0–0)
PLATELET # BLD AUTO: 259 X10*3/UL (ref 150–450)
POTASSIUM SERPL-SCNC: 3.7 MMOL/L (ref 3.5–5.3)
PROT SERPL-MCNC: 7.7 G/DL (ref 6.4–8.2)
RBC # BLD AUTO: 4.07 X10*6/UL (ref 4–5.2)
REFLEX ADDED, ANEMIA PANEL: NORMAL
RUBV IGG SERPL IA-ACNC: 0.5 IA
RUBV IGG SERPL QL IA: NEGATIVE
SODIUM SERPL-SCNC: 137 MMOL/L (ref 136–145)
T4 FREE SERPL-MCNC: 1.11 NG/DL (ref 0.78–1.48)
TREPONEMA PALLIDUM IGG+IGM AB [PRESENCE] IN SERUM OR PLASMA BY IMMUNOASSAY: NONREACTIVE
TSH SERPL-ACNC: 0.23 MIU/L (ref 0.44–3.98)
WBC # BLD AUTO: 4 X10*3/UL (ref 4.4–11.3)

## 2024-12-10 PROCEDURE — 36415 COLL VENOUS BLD VENIPUNCTURE: CPT

## 2024-12-10 PROCEDURE — 83021 HEMOGLOBIN CHROMOTOGRAPHY: CPT

## 2024-12-10 PROCEDURE — 86850 RBC ANTIBODY SCREEN: CPT

## 2024-12-10 PROCEDURE — 86900 BLOOD TYPING SEROLOGIC ABO: CPT

## 2024-12-10 PROCEDURE — 84439 ASSAY OF FREE THYROXINE: CPT

## 2024-12-10 PROCEDURE — 84443 ASSAY THYROID STIM HORMONE: CPT

## 2024-12-10 PROCEDURE — 86317 IMMUNOASSAY INFECTIOUS AGENT: CPT

## 2024-12-10 PROCEDURE — 86901 BLOOD TYPING SEROLOGIC RH(D): CPT

## 2024-12-10 PROCEDURE — 83036 HEMOGLOBIN GLYCOSYLATED A1C: CPT

## 2024-12-10 PROCEDURE — 85027 COMPLETE CBC AUTOMATED: CPT

## 2024-12-10 PROCEDURE — 80053 COMPREHEN METABOLIC PANEL: CPT

## 2024-12-10 PROCEDURE — 86780 TREPONEMA PALLIDUM: CPT

## 2024-12-10 PROCEDURE — 87340 HEPATITIS B SURFACE AG IA: CPT

## 2024-12-10 PROCEDURE — 86803 HEPATITIS C AB TEST: CPT

## 2024-12-10 PROCEDURE — 83020 HEMOGLOBIN ELECTROPHORESIS: CPT | Performed by: OBSTETRICS & GYNECOLOGY

## 2024-12-10 PROCEDURE — 87389 HIV-1 AG W/HIV-1&-2 AB AG IA: CPT

## 2024-12-10 NOTE — TELEPHONE ENCOUNTER
Name/ verified  Pt is 10.5 wks gest  She is calling to confirm that it is safe to use Metro Gel during  pregnancy. Dr. Christine confirmed TX. Pt verbalized understanding.

## 2024-12-11 LAB
ABO GROUP (TYPE) IN BLOOD: NORMAL
ANTIBODY SCREEN: NORMAL
RH FACTOR (ANTIGEN D): NORMAL

## 2024-12-12 LAB
HEMOGLOBIN A2: 2.8 % (ref 2–3.5)
HEMOGLOBIN A: 96.9 % (ref 95.8–98)
HEMOGLOBIN F: 0.3 % (ref 0–2)
HEMOGLOBIN IDENTIFICATION INTERPRETATION: NORMAL
PATH REVIEW-HGB IDENTIFICATION: NORMAL

## 2024-12-13 ENCOUNTER — CLINICAL SUPPORT (OUTPATIENT)
Dept: GENETICS | Facility: CLINIC | Age: 34
End: 2024-12-13
Payer: MEDICARE

## 2024-12-13 VITALS
DIASTOLIC BLOOD PRESSURE: 73 MMHG | HEIGHT: 67 IN | HEART RATE: 96 BPM | WEIGHT: 141 LBS | SYSTOLIC BLOOD PRESSURE: 111 MMHG | BODY MASS INDEX: 22.13 KG/M2

## 2024-12-13 DIAGNOSIS — O09.521 HIGH RISK PREGNANCY, MULTIGRAVIDA OF ADVANCED MATERNAL AGE IN FIRST TRIMESTER (HHS-HCC): ICD-10-CM

## 2024-12-13 PROCEDURE — 96040 PR MEDICAL GENETICS COUNSELING EACH 30 MINUTES: CPT | Performed by: GENETIC COUNSELOR, MS

## 2024-12-13 PROCEDURE — 96040 HC GENETIC COUNSELING, EACH 30 MIN: CPT | Performed by: GENETIC COUNSELOR, MS

## 2024-12-13 ASSESSMENT — PAIN SCALES - GENERAL: PAINLEVEL_OUTOF10: 0-NO PAIN

## 2024-12-13 NOTE — PROGRESS NOTES
Thank you for the referral of Jd Witt.  She is a 34 year old, , female who was 11 1/7 weeks pregnant at the time of our appointment with an EDC of July 3, 2025.  She was seen for genetic counseling due to advanced maternal age.        PAST HISTORY:  Patient had genetic counseling in her previous pregnancy due to increased risk for Down syndrome based on first trimester screening. She had cfDNA screening for aneuploidy via MaterniTGenome screen which was negative. She reports she delivered a healthy baby girl.     In regard to the current pregnancy, patient had a dating viability ultrasound at 6 4/7 weeks gestation. She has not had any genetic screening/testing for this pregnancy.     Ms. Witt had a known history of fibroids. She previously had negative screening for hemoglobin traits via CBC and hemoglobin electrophoresis.     FAMILY HISTORY  Medical and family histories were updated/reviewed and the following concerns were reported:    Ms. Witt:  Personal history- anxiety, anemia  Sons- breathing problems, eczema, dairy intolerance  Sister with sickle cell disease, cerebral aneurysm, stroke  Father- cerebral aneurysm at 47-49,   Sister- bipolar disorder  Mother- bipolar disorder     Her partner, Ady:  Personal history- asthma  Multiple family members with breathing/respiratory problems  Mother -intestinal cancer last year (age 60), possible recurrence now  Maternal aunt- breast cancer diagnosed in her 40's,  at 48  Maternal grandmother- breast cancer diagnosed at 55,  at 55  The remainder of the family history was negative for birth defects, intellectual disability, recurrent pregnancy loss, or recognized inherited conditions.  Consanguinity denied.          SELF REPORTED RACE/ETHNICITY:  Patient:   Patient's partner:      COUNSELING:    The following information was discussed with your patient:    1. The general population risk of 3-5% for  birth defects in every pregnancy.    2. Chromosomes, genes, non-disjunction, and common aneuploidies associated with advanced maternal age. Based on the age of 34 at EDC, the first trimester risk for having a fetus with Down syndrome is 1 in 240.  The risk for any trisomy is 1 in 180.    3 The availability, benefits and limitations of ultrasound study. An ultrasound study is recommended between 11-14 weeks gestation including nuchal translucency measurement and at 19-20 weeks gestation to survey fetal organs. An ultrasound study in the second trimester can identify 50% of Down syndrome cases and 90% of trisomy 18 or trisomy 13 cases.     4. The availability, benefits and limitations of non-invasive prenatal screening.  We discussed the methodology for this testing, which includes using cell-free DNA obtained from a mother's blood to screen for the presence of common chromosomal abnormalities (trisomies 21, 13, 18, and sex chromosome aneuploidies). Depending on the laboratory used, there is a >99% sensitivity for Down syndrome, at least 97.4% sensitivity for trisomy 18, and at least 87.5% sensitivity for trisomy 13.  Specificity for these trisomies is >99%. Although sensitivity and specificity rates are high, not all positive results are confirmed to be true positives. False negative results are rare. In addition, anticoagulants, maternal chromosome abnormality, fibroids or malignancy may impact results and/or be associated with inconclusive or other abnormal findings. Therefore, in the event of an abnormal result, prenatal diagnosis through amniocentesis should be considered to confirm the findings.  Results take approximately 7-10 days.      5. The availability of diagnostic fetal chromosome analysis via amniocentesis. The methods, benefits, limitations and risks of amniocentesis and a 1 in 400 risk of complications, including a 1 in 800 risk of miscarriage.    6. Per updated ACOG recommendations from March 2017,  we discussed the availability, benefits, and limitations of carrier screening for cystic fibrosis (CF), spinal muscular atrophy (SMA), and hemoglobinopathies/thalassemias. We reviewed the carrier frequencies of these conditions, varied clinical manifestations, and their autosomal recessive inheritance. The patient has already had negative carrier screening for hemoglobinopathies and thalassemias via CBC and hemoglobin electrophoresis and these results were reviewed. If both members of the couple are found to be carriers for the same condition, there is a 25% chance for an affected child and prenatal diagnosis would be available. Negative carrier screening does not rule out the possibility of being a carrier.  screening is available for CF, hemoglobinopathies, and thalassemias, and SMA.  We also discussed the availability of more expanded/pan ethnic carrier screening for additional, primarily autosomal recessive, conditions. We discussed the pros and cons of expanded carrier screening including the higher likelihood of being identified as a carrier for at least one condition on a larger panel. Approximately 4% of couples are found to be at risk to have a child with a genetic disorder based on this screening. In rare cases, expanded carrier screening results may have health implications for the tested individual.      7. Additional family history concerns:    Ms. Witt's partner has a personal history of asthma and has multiple family members with breathing problems. The couples children also have breathing problems. Often, respiratory issues are due to a combination of genetic and environmental factors (which often remain unknown). The risk to have them often depends on the amount and degree of affected family members. It also depends on if an underlying genetic cause of these conditions can be identified. With multiple affected family members, Ms. Witt's partner's offspring are at an increased risk for  breathing problems.      Ms. Witt has a personal history of mental illness (anxiety) multiple family members with mental illness (bipolar disorder). Often, mental illness is due to a combination of genetic and environmental factors (which often remain unknown). The risk to have mental illness often depends on the amount and degree of affected family members. With a personal history of mental illness and multiple affected family members, Ms. Witt's offspring are at an increased risk for mental illness.      Ms. Witt's partner previously reported that his maternal aunt was diagnosed with breast cancer her 40's and his maternal grandmother was diagnosed with breast cancer at the age of 55. His mother also has a recent diagnosis of cancer in her 60s. We discussed that cancer genetic counseling is recommended for his mother and other close relatives to the individuals with cancer. An appointment with our Cancer Genetics Clinic can be made by calling [phone]. At that time, an assessment of the family history of cancer, personal cancer risk and options for risk reduction would then be discussed. If his mother is unable or unwilling to have cancer genetic counseling, other family members (including her partner) may seek cancer genetic counseling based on this family history.      Ms. Witt's father and sister both had cerebral aneurysms. We discussed that the patient should see Neurology as she is at an increased risk for aneurysms. If there is an underlying genetic etiology of the aneurysms, the risk can be up to 50%, depending on the genetic condition. General genetic counseling is recommended for Ms. Witt's sister and an appointment can be made by calling [phone].     Ms. Witt's sister has  sickle cell disease. While other family members are at increased risk to have sickle cell trait, we reviewed that Ms. Witt has had negative testing for this.              DISPOSITION:  The patient  stated that she understood the above information and DECLINES to proceed with any genetic screening for Down syndrome or other aneuploidies at this time. States that testing would cause more anxiety for her and she would not want to proceed with diagnostic procedure if testing was concerning. She is aware that testing is available throughout the pregnancy should she change her mind or any concerns arise from ultrasound.           Yanci Hylton MS, Licensed Genetic Counselor spent 28 minutes with the patient with greater than 50% of the time spent in face to face counseling.     Thank you for allowing us to participate in the care of your patient.  Should you or your patient have any questions, please do not hesitate to contact our office at [phone].      Sincerely,      Yanci Hylton MS  Licensed Genetic Counselor

## 2024-12-17 LAB
CYTOLOGY CMNT CVX/VAG CYTO-IMP: NORMAL
HPV HR 12 DNA GENITAL QL NAA+PROBE: NEGATIVE
HPV HR GENOTYPES PNL CVX NAA+PROBE: NEGATIVE
HPV16 DNA SPEC QL NAA+PROBE: NEGATIVE
HPV18 DNA SPEC QL NAA+PROBE: NEGATIVE
LAB AP HPV GENOTYPE QUESTION: YES
LAB AP HPV HR: NORMAL
LAB AP PAP ADDITIONAL TESTS: NORMAL
LABORATORY COMMENT REPORT: NORMAL
LMP START DATE: NORMAL
MENSTRUAL HX REPORTED: NORMAL
PATH REPORT.TOTAL CANCER: NORMAL

## 2024-12-18 ENCOUNTER — TELEPHONE (OUTPATIENT)
Dept: OBSTETRICS AND GYNECOLOGY | Facility: CLINIC | Age: 34
End: 2024-12-18
Payer: MEDICARE

## 2024-12-18 NOTE — TELEPHONE ENCOUNTER
Pt verified by name and .  Pt is aware of Dr. Christine's message:  Needs PCP or Endocrine follow up for low TSH in pregnancy. Awaiting T3/T4  Pt has no questions at this time.

## 2024-12-19 ENCOUNTER — OFFICE VISIT (OUTPATIENT)
Dept: PRIMARY CARE | Facility: CLINIC | Age: 34
End: 2024-12-19
Payer: MEDICARE

## 2024-12-19 ENCOUNTER — LAB (OUTPATIENT)
Dept: LAB | Facility: LAB | Age: 34
End: 2024-12-19
Payer: MEDICARE

## 2024-12-19 ENCOUNTER — HOSPITAL ENCOUNTER (OUTPATIENT)
Dept: RADIOLOGY | Facility: CLINIC | Age: 34
Discharge: HOME | End: 2024-12-19
Payer: MEDICARE

## 2024-12-19 VITALS
WEIGHT: 143.8 LBS | OXYGEN SATURATION: 98 % | HEIGHT: 67 IN | SYSTOLIC BLOOD PRESSURE: 118 MMHG | DIASTOLIC BLOOD PRESSURE: 72 MMHG | BODY MASS INDEX: 22.57 KG/M2 | HEART RATE: 97 BPM

## 2024-12-19 DIAGNOSIS — R35.0 FREQUENT URINATION: ICD-10-CM

## 2024-12-19 DIAGNOSIS — R79.89 ABNORMAL TSH: ICD-10-CM

## 2024-12-19 DIAGNOSIS — O26.899 PELVIC PAIN AFFECTING PREGNANCY, ANTEPARTUM (HHS-HCC): ICD-10-CM

## 2024-12-19 DIAGNOSIS — R10.2 PELVIC PAIN AFFECTING PREGNANCY, ANTEPARTUM (HHS-HCC): ICD-10-CM

## 2024-12-19 DIAGNOSIS — R79.89 ABNORMAL TSH: Primary | ICD-10-CM

## 2024-12-19 LAB
T3 SERPL-MCNC: 176 NG/DL (ref 60–200)
T4 FREE SERPL-MCNC: 1.22 NG/DL (ref 0.78–1.48)
TSH SERPL-ACNC: 0.19 MIU/L (ref 0.44–3.98)

## 2024-12-19 PROCEDURE — 36415 COLL VENOUS BLD VENIPUNCTURE: CPT

## 2024-12-19 PROCEDURE — 84443 ASSAY THYROID STIM HORMONE: CPT

## 2024-12-19 PROCEDURE — 1036F TOBACCO NON-USER: CPT | Performed by: STUDENT IN AN ORGANIZED HEALTH CARE EDUCATION/TRAINING PROGRAM

## 2024-12-19 PROCEDURE — 99214 OFFICE O/P EST MOD 30 MIN: CPT | Performed by: STUDENT IN AN ORGANIZED HEALTH CARE EDUCATION/TRAINING PROGRAM

## 2024-12-19 PROCEDURE — 76813 OB US NUCHAL MEAS 1 GEST: CPT | Performed by: OBSTETRICS & GYNECOLOGY

## 2024-12-19 PROCEDURE — 3008F BODY MASS INDEX DOCD: CPT | Performed by: STUDENT IN AN ORGANIZED HEALTH CARE EDUCATION/TRAINING PROGRAM

## 2024-12-19 PROCEDURE — 84480 ASSAY TRIIODOTHYRONINE (T3): CPT

## 2024-12-19 PROCEDURE — 84439 ASSAY OF FREE THYROXINE: CPT

## 2024-12-19 PROCEDURE — 76813 OB US NUCHAL MEAS 1 GEST: CPT

## 2024-12-19 ASSESSMENT — ENCOUNTER SYMPTOMS
OCCASIONAL FEELINGS OF UNSTEADINESS: 0
LOSS OF SENSATION IN FEET: 0
DEPRESSION: 0

## 2024-12-19 NOTE — PROGRESS NOTES
"Subjective   Patient ID: Jd Witt is a 34 y.o. female who presents for Follow-up (Discuss low thyroid during pregnancy OB recommended PCP. Pt declined flu shot.).        HPI    Est care   Was advised to fu on abnormal thyroid labs   12 weeks pregnant   LIANET:  7/3/22     T4 WNL   Tsh : low             Visit Vitals  /72   Pulse 97   Ht 1.702 m (5' 7\")   Wt 65.2 kg (143 lb 12.8 oz)   LMP 09/26/2024 (Approximate)   SpO2 98%   BMI 22.52 kg/m²   OB Status Pregnant   Smoking Status Never   BSA 1.76 m²      Patient's last menstrual period was 09/26/2024 (approximate).   Current Outpatient Medications   Medication Instructions    aspirin 162 mg, oral, Daily    prenatal vitamin, iron-folic, 27 mg iron-800 mcg folic acid tablet 1 tablet, oral, Daily    prenatal vitamin, iron-folic, 27 mg iron-800 mcg folic acid tablet 1 tablet, oral, Daily    pyridoxine (VITAMIN B-6) 25 mg, oral, Every 8 hours      Social History     Tobacco Use    Smoking status: Never    Smokeless tobacco: Never   Substance Use Topics    Alcohol use: Not Currently        Review of Systems    Constitutional : No feeling poorly / fevers/ chills / night sweats/ fatigue     Gastrointestinal : No abd pain / N/V  No bloody stools/ melena / constipation  Endo : No polyuria/polydipsia/ muscle weakness / sluggishness   CNS: No confusion / HA/ tingling/ numbness/ weakness of extremities      All other systems have been reviewed and are negative for complaint       Physical Exam    Constitutional : Vitals reviewed. Alert and in no distress  Cardiovascular : RRR, Normal S1, S2, No pericardial rub/ gallop, no peripheral edema   Pulmonary: No respiratory distress, CTAB   MSK : Normal gait and station , strength and tone   Skin: Warm to touch ,  normal skin turgor   Neurologic : CNs 2-12 grossly intact , no obvious FNDs  Psych : A,Ox3, normal mood and affect      Assessment/Plan   Diagnoses and all orders for this visit:  Abnormal TSH  -     TSH with " reflex to Free T4 if abnormal; Future  -     Triiodothyronine, Total; Future       35 y/o female  , currently 12 weeks pregnant presents to [facility] care and fu on abnormal thyroid labs   Denied any sx of hypo  Rpt labs as above                   Conditions addressed and mgmt as noted above.  Pertinent labs, images/ imaging reports , chart review was done .   Age appropriate labs / labs for mgmt of chronic medical conditions ordered, further mgmt pending the results.       This note is intended for the physician writing it, as well as to communicate findings to other healthcare professionals. These notes use medical lexicon that may be misunderstood by non medical persons. Therefore, interpretations of medical notes and terminology should be approached with caution.

## 2025-01-06 ENCOUNTER — ROUTINE PRENATAL (OUTPATIENT)
Dept: OBSTETRICS AND GYNECOLOGY | Facility: CLINIC | Age: 35
End: 2025-01-06
Payer: MEDICARE

## 2025-01-06 VITALS — BODY MASS INDEX: 22.71 KG/M2 | SYSTOLIC BLOOD PRESSURE: 102 MMHG | WEIGHT: 145 LBS | DIASTOLIC BLOOD PRESSURE: 64 MMHG

## 2025-01-06 DIAGNOSIS — O23.42 URINARY TRACT INFECTION IN MOTHER DURING SECOND TRIMESTER OF PREGNANCY (HHS-HCC): ICD-10-CM

## 2025-01-06 DIAGNOSIS — R30.0 DYSURIA: ICD-10-CM

## 2025-01-06 DIAGNOSIS — O34.29 UTERINE SCAR FROM PREVIOUS SURGERY AFFECTING PREGNANCY (HHS-HCC): ICD-10-CM

## 2025-01-06 DIAGNOSIS — B37.31 VAGINAL YEAST INFECTION: ICD-10-CM

## 2025-01-06 DIAGNOSIS — N89.8 VAGINAL ODOR: ICD-10-CM

## 2025-01-06 DIAGNOSIS — Z98.891 STATUS POST CESAREAN SECTION: ICD-10-CM

## 2025-01-06 DIAGNOSIS — Z3A.14 14 WEEKS GESTATION OF PREGNANCY (HHS-HCC): Primary | ICD-10-CM

## 2025-01-06 PROCEDURE — 87086 URINE CULTURE/COLONY COUNT: CPT

## 2025-01-06 PROCEDURE — 87186 SC STD MICRODIL/AGAR DIL: CPT

## 2025-01-06 PROCEDURE — 0501F PRENATAL FLOW SHEET: CPT | Performed by: ADVANCED PRACTICE MIDWIFE

## 2025-01-06 PROCEDURE — 87205 SMEAR GRAM STAIN: CPT

## 2025-01-06 RX ORDER — TERCONAZOLE 4 MG/G
1 CREAM VAGINAL NIGHTLY
Qty: 45 G | Refills: 0 | Status: SHIPPED | OUTPATIENT
Start: 2025-01-06 | End: 2025-01-13

## 2025-01-06 NOTE — PROGRESS NOTES
Ob Visit  25     SUBJECTIVE      HPI: Jd Witt is a 34 y.o.  at 14w4d here for RPNV.  She has no contractions, light spotting bleeding, or no LOF. Reports no normal fetal movement. Patient reports took two doses of metro gel then started having dysuria.         OBJECTIVE  Visit Vitals  /64   Wt 65.8 kg (145 lb)   LMP 2024 (Approximate)   BMI 22.71 kg/m²   OB Status Pregnant   Smoking Status Never   BSA 1.76 m²            ASSESSMENT & PLAN    Jd Witt is a 34 y.o.  at 14w4d here for the following concerns we addressed today:    Problem List Items Addressed This Visit          Ob-Gyn Problems    Uterine scar from previous surgery affecting pregnancy ([facility])    Overview     G1 Twins primary c/s  G2 !!  MFMU 96.2% prepreg weight 126lb    Plans to TOLAC again!         14 weeks gestation of pregnancy ([facility]) - Primary    Overview     Desired provider in labor: [] CNM  [x] Physician  [] Blood Products: [] Yes, accepts [] No, needs counseling  [x] Initial BMI: 19.73   [] Prenatal Labs:   [] Cervical Cancer Screening up to date  [x] Rh status:  O+  [] Genetic Screening:    [x] NT US: (11-13 wks)  [] Baby ASA (if indicated):  [x] Pregnancy dated by: 6w4d    [] Anatomy US: (19-20 wks)  [] Federal Sterilization consent signed (if indicated):  [] 1hr GCT at 24-28wks:  [] Rhogam (if indicated):   [] Fetal Surveillance (if indicated):  [] Tdap (27-32 wks, may be given up to 36 wks if initial window missed):   [] RSV (32-36 wks) (Sept. to end ):   [] Flu Vaccine:    [] Breastfeeding:  [] Postpartum Birth control method:   [] GBS at 36 - 37 wks:  [] 39 weeks discussion of IOL vs. Expectant management:  [] Mode of delivery ( anticipated ):           Other Visit Diagnoses       Vaginal odor        Relevant Orders    Vaginitis Gram Stain For Bacterial Vaginosis + Yeast    Dysuria        Relevant Orders    Urine Culture    Status post  section        Vaginal  yeast infection        Relevant Medications    terconazole (Terazol 7) 0.4 % vaginal cream              RTC in 4 weeks      SIMRAN Sykes-SAMUELM

## 2025-01-07 PROBLEM — Z87.59 HISTORY OF GESTATIONAL HYPERTENSION: Status: ACTIVE | Noted: 2025-01-07

## 2025-01-08 ENCOUNTER — APPOINTMENT (OUTPATIENT)
Dept: OBSTETRICS AND GYNECOLOGY | Facility: CLINIC | Age: 35
End: 2025-01-08
Payer: MEDICARE

## 2025-01-08 LAB
BACTERIA UR CULT: ABNORMAL
CLUE CELLS VAG LPF-#/AREA: NORMAL /[LPF]
NUGENT SCORE: 1
YEAST VAG WET PREP-#/AREA: NORMAL

## 2025-01-08 RX ORDER — NITROFURANTOIN (MACROCRYSTALS) 100 MG/1
100 CAPSULE ORAL 2 TIMES DAILY
Qty: 14 CAPSULE | Refills: 0 | Status: SHIPPED | OUTPATIENT
Start: 2025-01-08 | End: 2025-01-15

## 2025-01-10 ENCOUNTER — TELEPHONE (OUTPATIENT)
Dept: OBSTETRICS AND GYNECOLOGY | Facility: CLINIC | Age: 35
End: 2025-01-10
Payer: MEDICARE

## 2025-01-10 NOTE — TELEPHONE ENCOUNTER
Contacted pt  Name and  verified  Spoke with pt about recent spotting she is now experiencing states she had some light cramping with a very small clot about the size of half a pea, the burning has stopped no pressure  She will stop the yeast infection medication as those results were negative. Is aware the UTI was positive and has started that medication today. She will increase her fluid intake and monitor for more bleeding in the meantime. Reviewed bleeding precautions with patient she will call office back if bleeding increases. I will make provider aware of this and contact pt if any further action is needed.  Pt verbalized understanding.  No further questions or concerns at this time.

## 2025-02-05 ENCOUNTER — APPOINTMENT (OUTPATIENT)
Dept: OBSTETRICS AND GYNECOLOGY | Facility: CLINIC | Age: 35
End: 2025-02-05
Payer: MEDICARE

## 2025-02-05 VITALS — DIASTOLIC BLOOD PRESSURE: 80 MMHG | BODY MASS INDEX: 23.65 KG/M2 | WEIGHT: 151 LBS | SYSTOLIC BLOOD PRESSURE: 102 MMHG

## 2025-02-05 DIAGNOSIS — F32.A ANXIETY AND DEPRESSION: ICD-10-CM

## 2025-02-05 DIAGNOSIS — O09.522 HIGH RISK PREGNANCY, MULTIGRAVIDA OF ADVANCED MATERNAL AGE IN SECOND TRIMESTER (HHS-HCC): Primary | ICD-10-CM

## 2025-02-05 DIAGNOSIS — O34.29 UTERINE SCAR FROM PREVIOUS SURGERY AFFECTING PREGNANCY (HHS-HCC): ICD-10-CM

## 2025-02-05 DIAGNOSIS — F41.9 ANXIETY AND DEPRESSION: ICD-10-CM

## 2025-02-05 DIAGNOSIS — Z98.891 HISTORY OF VBAC: ICD-10-CM

## 2025-02-05 PROCEDURE — 99214 OFFICE O/P EST MOD 30 MIN: CPT | Performed by: OBSTETRICS & GYNECOLOGY

## 2025-02-05 ASSESSMENT — ENCOUNTER SYMPTOMS
CARDIOVASCULAR NEGATIVE: 0
CONSTITUTIONAL NEGATIVE: 0
GASTROINTESTINAL NEGATIVE: 0
NEUROLOGICAL NEGATIVE: 0
ENDOCRINE NEGATIVE: 0
PSYCHIATRIC NEGATIVE: 0
RESPIRATORY NEGATIVE: 0
HEMATOLOGIC/LYMPHATIC NEGATIVE: 0
ALLERGIC/IMMUNOLOGIC NEGATIVE: 0
MUSCULOSKELETAL NEGATIVE: 0
EYES NEGATIVE: 0

## 2025-02-06 ENCOUNTER — HOSPITAL ENCOUNTER (OUTPATIENT)
Dept: RADIOLOGY | Facility: CLINIC | Age: 35
Discharge: HOME | End: 2025-02-06
Payer: MEDICARE

## 2025-02-06 DIAGNOSIS — O09.522 SUPERVISION OF ELDERLY MULTIGRAVIDA, SECOND TRIMESTER: ICD-10-CM

## 2025-02-06 DIAGNOSIS — O09.521 HIGH RISK PREGNANCY, MULTIGRAVIDA OF ADVANCED MATERNAL AGE IN FIRST TRIMESTER (HHS-HCC): ICD-10-CM

## 2025-02-06 PROCEDURE — 76811 OB US DETAILED SNGL FETUS: CPT
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
## 2025-02-21 ENCOUNTER — ROUTINE PRENATAL (OUTPATIENT)
Dept: OBSTETRICS AND GYNECOLOGY | Facility: CLINIC | Age: 35
End: 2025-02-21
Payer: COMMERCIAL

## 2025-02-21 VITALS — DIASTOLIC BLOOD PRESSURE: 77 MMHG | WEIGHT: 156.5 LBS | SYSTOLIC BLOOD PRESSURE: 120 MMHG | BODY MASS INDEX: 24.51 KG/M2

## 2025-02-21 DIAGNOSIS — O28.5 ABNORMAL CHROMOSOMAL AND GENETIC FINDING ON ANTENATAL SCREENING OF MOTHER: ICD-10-CM

## 2025-02-21 DIAGNOSIS — Z3A.21 21 WEEKS GESTATION OF PREGNANCY (HHS-HCC): Primary | ICD-10-CM

## 2025-02-21 DIAGNOSIS — Z87.59 HISTORY OF GESTATIONAL HYPERTENSION: ICD-10-CM

## 2025-02-21 DIAGNOSIS — R30.0 DYSURIA: ICD-10-CM

## 2025-02-21 DIAGNOSIS — O34.29 UTERINE SCAR FROM PREVIOUS SURGERY AFFECTING PREGNANCY (HHS-HCC): ICD-10-CM

## 2025-02-21 PROCEDURE — 99078 GROUP HEALTH EDUCATION: CPT | Performed by: ADVANCED PRACTICE MIDWIFE

## 2025-02-21 PROCEDURE — 99213 OFFICE O/P EST LOW 20 MIN: CPT | Mod: 25,SB,TH | Performed by: ADVANCED PRACTICE MIDWIFE

## 2025-02-21 PROCEDURE — 99213 OFFICE O/P EST LOW 20 MIN: CPT | Performed by: ADVANCED PRACTICE MIDWIFE

## 2025-02-21 PROCEDURE — H1002 CARECOORDINATION PRENATAL: HCPCS | Performed by: ADVANCED PRACTICE MIDWIFE

## 2025-02-21 NOTE — PROGRESS NOTES
Subjective   Jd Witt is a 34 y.o.  at 21w1d with a working estimated date of delivery of 7/3/2025, by Last Menstrual Period who presents for Centering #2.     1:1 Visit:  She denies vaginal bleeding, leakage of fluid, or strong pelvic pain.    Objective   Physical Exam  Weight: 71 kg (156 lb 8 oz)  Expected Total Weight Gain: 11.5 kg (25 lb)-16 kg (35 lb)   Pregravid BMI: 19.73  BP: 120/77         Problem List Items Addressed This Visit       21 weeks gestation of pregnancy ([facility]) - Primary    Overview     Desired provider in labor: [] CNM  [x] Physician  [x] Blood Products: [x] Yes, accepts [] No, needs counseling  [x] Initial BMI: 19.73   [] Prenatal Labs:   [x] Cervical Cancer Screening up to date  [x] Rh status:  O+  [x] Genetic Screening:  declined  [x] NT US: (11-13 wks)  [] Baby ASA (if indicated):  [x] Pregnancy dated by: 6w4d    [x] Anatomy US: (19-20 wks)  [] Federal Sterilization consent signed (if indicated):  [] 1hr GCT at 24-28wks:  [] Rhogam (if indicated):   [] Fetal Surveillance (if indicated):  [] Tdap (27-32 wks, may be given up to 36 wks if initial window missed):   [] RSV (32-36 wks) (Sept. to end of ):   [] Flu Vaccine:    [] Breastfeeding:  [] Postpartum Birth control method:   [] GBS at 36 - 37 wks:  [] 39 weeks discussion of IOL vs. Expectant management:  [] Mode of delivery ( anticipated ):          Abnormal chromosomal and genetic finding on  screening of mother    History of gestational hypertension    Overview     In previous pregnancy   [  ]bASA         Uterine scar from previous surgery affecting pregnancy ([facility])    Overview     G1 Twins primary c/s  G2 !!  MFMU 96.2% prepreg weight 126lb    Plans to TOLAC again!            Centering Session #2 Plan:   Anatomy US reviewed ***  -The following educational material was reviewed:  Common discomforts/Changes in body  Self-Care / back care  Dental health  -Reviewed reasons to call CNM on-call:   vaginal bleeding, loss of fluid, severe pelvic pain, or any questions/concerns  -RTC for next Centering visit in 4 weeks or prn   next visit:    1:1 total time 10min  Centering Visit total time 120min    SIMRAN Sykes-SAMUELM

## 2025-03-06 ENCOUNTER — APPOINTMENT (OUTPATIENT)
Dept: OBSTETRICS AND GYNECOLOGY | Facility: CLINIC | Age: 35
End: 2025-03-06
Payer: MEDICARE

## 2025-03-06 VITALS — WEIGHT: 158.2 LBS | BODY MASS INDEX: 24.78 KG/M2 | DIASTOLIC BLOOD PRESSURE: 64 MMHG | SYSTOLIC BLOOD PRESSURE: 110 MMHG

## 2025-03-06 DIAGNOSIS — Z87.59 HISTORY OF GESTATIONAL HYPERTENSION: ICD-10-CM

## 2025-03-06 DIAGNOSIS — Z98.891 HISTORY OF VBAC: ICD-10-CM

## 2025-03-06 DIAGNOSIS — F41.9 ANXIETY AND DEPRESSION: ICD-10-CM

## 2025-03-06 DIAGNOSIS — O09.522 HIGH RISK PREGNANCY, MULTIGRAVIDA OF ADVANCED MATERNAL AGE IN SECOND TRIMESTER (HHS-HCC): Primary | ICD-10-CM

## 2025-03-06 DIAGNOSIS — O34.29 UTERINE SCAR FROM PREVIOUS SURGERY AFFECTING PREGNANCY (HHS-HCC): ICD-10-CM

## 2025-03-06 DIAGNOSIS — F32.A ANXIETY AND DEPRESSION: ICD-10-CM

## 2025-03-06 PROCEDURE — 99214 OFFICE O/P EST MOD 30 MIN: CPT | Performed by: OBSTETRICS & GYNECOLOGY

## 2025-03-06 ASSESSMENT — ENCOUNTER SYMPTOMS
ALLERGIC/IMMUNOLOGIC NEGATIVE: 0
EYES NEGATIVE: 0
GASTROINTESTINAL NEGATIVE: 0
PSYCHIATRIC NEGATIVE: 0
CARDIOVASCULAR NEGATIVE: 0
MUSCULOSKELETAL NEGATIVE: 0
NEUROLOGICAL NEGATIVE: 0
ENDOCRINE NEGATIVE: 0
RESPIRATORY NEGATIVE: 0
HEMATOLOGIC/LYMPHATIC NEGATIVE: 0
CONSTITUTIONAL NEGATIVE: 0
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
## 2025-03-21 ENCOUNTER — ROUTINE PRENATAL (OUTPATIENT)
Dept: OBSTETRICS AND GYNECOLOGY | Facility: CLINIC | Age: 35
End: 2025-03-21
Payer: COMMERCIAL

## 2025-03-21 VITALS — WEIGHT: 159.1 LBS | BODY MASS INDEX: 24.92 KG/M2 | SYSTOLIC BLOOD PRESSURE: 108 MMHG | DIASTOLIC BLOOD PRESSURE: 70 MMHG

## 2025-03-21 DIAGNOSIS — Z65.8 PSYCHOSOCIAL STRESSORS: ICD-10-CM

## 2025-03-21 DIAGNOSIS — D50.9 IRON DEFICIENCY ANEMIA, UNSPECIFIED IRON DEFICIENCY ANEMIA TYPE: ICD-10-CM

## 2025-03-21 DIAGNOSIS — O23.42 URINARY TRACT INFECTION IN MOTHER DURING SECOND TRIMESTER OF PREGNANCY (HHS-HCC): ICD-10-CM

## 2025-03-21 DIAGNOSIS — Z87.898 HISTORY OF DOMESTIC VIOLENCE: ICD-10-CM

## 2025-03-21 DIAGNOSIS — Z3A.25 25 WEEKS GESTATION OF PREGNANCY (HHS-HCC): Primary | ICD-10-CM

## 2025-03-21 DIAGNOSIS — R45.89 DEPRESSED MOOD: ICD-10-CM

## 2025-03-21 DIAGNOSIS — O34.29 UTERINE SCAR FROM PREVIOUS SURGERY AFFECTING PREGNANCY (HHS-HCC): ICD-10-CM

## 2025-03-21 PROCEDURE — 99078 GROUP HEALTH EDUCATION: CPT | Performed by: ADVANCED PRACTICE MIDWIFE

## 2025-03-21 PROCEDURE — 99213 OFFICE O/P EST LOW 20 MIN: CPT | Mod: 25,SB,TH | Performed by: ADVANCED PRACTICE MIDWIFE

## 2025-03-21 PROCEDURE — 99213 OFFICE O/P EST LOW 20 MIN: CPT | Performed by: ADVANCED PRACTICE MIDWIFE
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
## 2025-03-24 ENCOUNTER — DOCUMENTATION (OUTPATIENT)
Dept: BEHAVIORAL HEALTH | Facility: CLINIC | Age: 35
End: 2025-03-24
Payer: COMMERCIAL

## 2025-03-24 NOTE — PROGRESS NOTES
SW spoke to pt in person after Centering Pregnancy group to assess psychosocial needs and discuss referral for therapy. Pt gave verbal consent for SW to facilitate referral to LES.

## 2025-04-02 PROCEDURE — 85027 COMPLETE CBC AUTOMATED: CPT

## 2025-04-03 ENCOUNTER — APPOINTMENT (OUTPATIENT)
Dept: OBSTETRICS AND GYNECOLOGY | Facility: CLINIC | Age: 35
End: 2025-04-03
Payer: MEDICARE

## 2025-04-03 ENCOUNTER — APPOINTMENT (OUTPATIENT)
Dept: LAB | Facility: HOSPITAL | Age: 35
End: 2025-04-03
Payer: COMMERCIAL

## 2025-04-03 ENCOUNTER — DOCUMENTATION (OUTPATIENT)
Dept: BEHAVIORAL HEALTH | Facility: CLINIC | Age: 35
End: 2025-04-03

## 2025-04-03 VITALS — WEIGHT: 158.2 LBS | BODY MASS INDEX: 24.78 KG/M2 | SYSTOLIC BLOOD PRESSURE: 116 MMHG | DIASTOLIC BLOOD PRESSURE: 68 MMHG

## 2025-04-03 DIAGNOSIS — F41.9 ANXIETY AND DEPRESSION: ICD-10-CM

## 2025-04-03 DIAGNOSIS — Z87.59 HISTORY OF GESTATIONAL HYPERTENSION: ICD-10-CM

## 2025-04-03 DIAGNOSIS — O34.29 UTERINE SCAR FROM PREVIOUS SURGERY AFFECTING PREGNANCY (HHS-HCC): ICD-10-CM

## 2025-04-03 DIAGNOSIS — O09.522 HIGH RISK PREGNANCY, MULTIGRAVIDA OF ADVANCED MATERNAL AGE IN SECOND TRIMESTER (HHS-HCC): Primary | ICD-10-CM

## 2025-04-03 DIAGNOSIS — O23.42 URINARY TRACT INFECTION IN MOTHER DURING SECOND TRIMESTER OF PREGNANCY (HHS-HCC): ICD-10-CM

## 2025-04-03 DIAGNOSIS — F32.A ANXIETY AND DEPRESSION: ICD-10-CM

## 2025-04-03 DIAGNOSIS — Z98.891 HISTORY OF VBAC: ICD-10-CM

## 2025-04-03 LAB
ERYTHROCYTE [DISTWIDTH] IN BLOOD BY AUTOMATED COUNT: 13.6 % (ref 11.5–14.5)
HCT VFR BLD AUTO: 35 % (ref 36–46)
HGB BLD-MCNC: 11.1 G/DL (ref 12–16)
MCH RBC QN AUTO: 29.4 PG (ref 26–34)
MCHC RBC AUTO-ENTMCNC: 31.7 G/DL (ref 32–36)
MCV RBC AUTO: 93 FL (ref 80–100)
NRBC BLD-RTO: 0 /100 WBCS (ref 0–0)
PLATELET # BLD AUTO: 231 X10*3/UL (ref 150–450)
RBC # BLD AUTO: 3.77 X10*6/UL (ref 4–5.2)
REFLEX ADDED, ANEMIA PANEL: NORMAL
WBC # BLD AUTO: 4.9 X10*3/UL (ref 4.4–11.3)

## 2025-04-03 PROCEDURE — 99214 OFFICE O/P EST MOD 30 MIN: CPT | Performed by: OBSTETRICS & GYNECOLOGY
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
## 2025-04-04 NOTE — PROGRESS NOTES
She reports good FM and is w/o sx of PTL or PEC.     She reports difficulty with sleep, stress 2/2 FOB not involved, but has F/U with a therapist at [facility]. She has connected with a [employer]  and feels that she is doing ok with resources and support. She is participating in Centering Pregnancy.     She will do her 2nd trimester labs today.     A/P: HROB - AMA, anxiety and depression, h/o , h/o HTN in a prior pregnancy    -  RTC 2 weeks     -  FM d/w the patient     -  2nd trimester labs     -  F/U US for growth     -  Psychology F/U - counseling

## 2025-04-05 ENCOUNTER — TELEPHONE (OUTPATIENT)
Dept: OBSTETRICS AND GYNECOLOGY | Facility: HOSPITAL | Age: 35
End: 2025-04-05
Payer: COMMERCIAL

## 2025-04-05 ENCOUNTER — HOSPITAL ENCOUNTER (OUTPATIENT)
Facility: HOSPITAL | Age: 35
End: 2025-04-05
Attending: OBSTETRICS & GYNECOLOGY | Admitting: OBSTETRICS & GYNECOLOGY
Payer: COMMERCIAL

## 2025-04-05 ENCOUNTER — HOSPITAL ENCOUNTER (OUTPATIENT)
Facility: HOSPITAL | Age: 35
Discharge: HOME | End: 2025-04-05
Attending: OBSTETRICS & GYNECOLOGY | Admitting: OBSTETRICS & GYNECOLOGY
Payer: COMMERCIAL

## 2025-04-05 VITALS
TEMPERATURE: 97.3 F | OXYGEN SATURATION: 98 % | HEART RATE: 83 BPM | RESPIRATION RATE: 18 BRPM | SYSTOLIC BLOOD PRESSURE: 121 MMHG | DIASTOLIC BLOOD PRESSURE: 74 MMHG

## 2025-04-05 DIAGNOSIS — O99.612 CONSTIPATION IN PREGNANCY IN SECOND TRIMESTER (HHS-HCC): Primary | ICD-10-CM

## 2025-04-05 DIAGNOSIS — K59.00 CONSTIPATION IN PREGNANCY IN SECOND TRIMESTER (HHS-HCC): Primary | ICD-10-CM

## 2025-04-05 LAB
POC APPEARANCE, URINE: CLEAR
POC BILIRUBIN, URINE: NEGATIVE
POC BLOOD, URINE: ABNORMAL
POC COLOR, URINE: YELLOW
POC GLUCOSE, URINE: NEGATIVE MG/DL
POC KETONES, URINE: NEGATIVE MG/DL
POC LEUKOCYTES, URINE: ABNORMAL
POC NITRITE,URINE: NEGATIVE
POC PH, URINE: 7.5 PH
POC PROTEIN, URINE: NEGATIVE MG/DL
POC SPECIFIC GRAVITY, URINE: 1.01
POC UROBILINOGEN, URINE: 0.2 EU/DL

## 2025-04-05 PROCEDURE — 2500000001 HC RX 250 WO HCPCS SELF ADMINISTERED DRUGS (ALT 637 FOR MEDICARE OP): Mod: SE | Performed by: NURSE PRACTITIONER

## 2025-04-05 PROCEDURE — 99214 OFFICE O/P EST MOD 30 MIN: CPT

## 2025-04-05 PROCEDURE — 99213 OFFICE O/P EST LOW 20 MIN: CPT | Performed by: NURSE PRACTITIONER

## 2025-04-05 PROCEDURE — 87086 URINE CULTURE/COLONY COUNT: CPT | Performed by: NURSE PRACTITIONER

## 2025-04-05 RX ORDER — DOCUSATE SODIUM 100 MG/1
100 CAPSULE, LIQUID FILLED ORAL 2 TIMES DAILY
Qty: 30 CAPSULE | Refills: 5 | Status: SHIPPED | OUTPATIENT
Start: 2025-04-05

## 2025-04-05 RX ORDER — ACETAMINOPHEN 325 MG/1
975 TABLET ORAL ONCE
Status: COMPLETED | OUTPATIENT
Start: 2025-04-05 | End: 2025-04-05

## 2025-04-05 RX ORDER — POLYETHYLENE GLYCOL 3350 17 G/17G
17 POWDER, FOR SOLUTION ORAL 2 TIMES DAILY PRN
Qty: 60 PACKET | Refills: 0 | Status: SHIPPED | OUTPATIENT
Start: 2025-04-05 | End: 2025-05-05

## 2025-04-05 RX ORDER — CYCLOBENZAPRINE HCL 10 MG
5 TABLET ORAL ONCE
Status: COMPLETED | OUTPATIENT
Start: 2025-04-05 | End: 2025-04-05

## 2025-04-05 RX ORDER — ONDANSETRON HYDROCHLORIDE 2 MG/ML
4 INJECTION, SOLUTION INTRAVENOUS EVERY 6 HOURS PRN
Status: DISCONTINUED | OUTPATIENT
Start: 2025-04-05 | End: 2025-04-05 | Stop reason: HOSPADM

## 2025-04-05 RX ORDER — LIDOCAINE HYDROCHLORIDE 10 MG/ML
0.5 INJECTION, SOLUTION INFILTRATION; PERINEURAL ONCE AS NEEDED
Status: DISCONTINUED | OUTPATIENT
Start: 2025-04-05 | End: 2025-04-05 | Stop reason: HOSPADM

## 2025-04-05 RX ORDER — ONDANSETRON 4 MG/1
4 TABLET, FILM COATED ORAL EVERY 6 HOURS PRN
Status: DISCONTINUED | OUTPATIENT
Start: 2025-04-05 | End: 2025-04-05 | Stop reason: HOSPADM

## 2025-04-05 RX ADMIN — CYCLOBENZAPRINE HYDROCHLORIDE 5 MG: 10 TABLET, FILM COATED ORAL at 17:46

## 2025-04-05 RX ADMIN — ACETAMINOPHEN 975 MG: 325 TABLET, FILM COATED ORAL at 17:43

## 2025-04-05 ASSESSMENT — PAIN SCALES - GENERAL: PAINLEVEL_OUTOF10: 5 - MODERATE PAIN

## 2025-04-05 NOTE — TELEPHONE ENCOUNTER
Returned call to the pt through the answering service. Pt called-- 27 weeks with message stating she is experiencing abdominal and back pain.  It was approximately 90 minutes by the time I could return the call given clinical acuity of the unit. Message left-- instructed to call back or present to triage if concerned and desires evaluation.
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
## 2025-04-06 DIAGNOSIS — O09.522 HIGH RISK PREGNANCY, MULTIGRAVIDA OF ADVANCED MATERNAL AGE IN SECOND TRIMESTER (HHS-HCC): ICD-10-CM

## 2025-04-07 LAB — BACTERIA UR CULT: NORMAL

## 2025-04-08 LAB
GLUCOSE 1H P 50 G GLC PO SERPL-MCNC: 111 MG/DL
T PALLIDUM AB SER QL IA: NEGATIVE

## 2025-04-11 ENCOUNTER — ROUTINE PRENATAL (OUTPATIENT)
Dept: OBSTETRICS AND GYNECOLOGY | Facility: CLINIC | Age: 35
End: 2025-04-11
Payer: COMMERCIAL

## 2025-04-11 VITALS — WEIGHT: 163 LBS | SYSTOLIC BLOOD PRESSURE: 127 MMHG | DIASTOLIC BLOOD PRESSURE: 78 MMHG | BODY MASS INDEX: 25.53 KG/M2

## 2025-04-11 DIAGNOSIS — M25.552 BILATERAL HIP PAIN: ICD-10-CM

## 2025-04-11 DIAGNOSIS — Z3A.28 28 WEEKS GESTATION OF PREGNANCY (HHS-HCC): Primary | ICD-10-CM

## 2025-04-11 DIAGNOSIS — M25.551 BILATERAL HIP PAIN: ICD-10-CM

## 2025-04-11 PROCEDURE — 99078 GROUP HEALTH EDUCATION: CPT | Performed by: ADVANCED PRACTICE MIDWIFE

## 2025-04-11 PROCEDURE — 99213 OFFICE O/P EST LOW 20 MIN: CPT | Performed by: ADVANCED PRACTICE MIDWIFE

## 2025-04-11 PROCEDURE — 99213 OFFICE O/P EST LOW 20 MIN: CPT | Mod: 25,SB,TH | Performed by: ADVANCED PRACTICE MIDWIFE

## 2025-04-11 NOTE — PROGRESS NOTES
Subjective   Jd Witt is a 34 y.o.  at 28w1d with a working estimated date of delivery of 7/3/2025, by Last Menstrual Period who presents for Centering #4.    1:1 Visit:  She denies vaginal bleeding, leakage of fluid, or strong/consistent contractions. Endorses good fetal movement.   - has visit at [facility]   Was very tearful last visit   Hemorrhoids- better with prep H  Accompanied by her  Desire from [employer]     Objective   Physical Exam  Weight: 73.9 kg (163 lb)  Expected Total Weight Gain: 11.5 kg (25 lb)-16 kg (35 lb)   Pregravid BMI: 19.73  BP: 127/78  Fetal Heart Rate: 150 Fundal Height (cm): 28 cm    Problem List Items Addressed This Visit       28 weeks gestation of pregnancy ([facility]) - Primary    Overview     Desired provider in labor: [] CNM  [x] Physician  [x] Blood Products: [x] Yes, accepts [] No, needs counseling  [x] Initial BMI: 19.73   [] Prenatal Labs:   [x] Cervical Cancer Screening up to date  [x] Rh status:  O+  [x] Genetic Screening:  declined  [x] NT US: (11-13 wks)  [] Baby ASA (if indicated):  [x] Pregnancy dated by: 6w4d    [x] Anatomy US: (19-20 wks)  [] Federal Sterilization consent signed (if indicated):  [x] 1hr GCT at 24-28wks:  [] [] Fetal Surveillance (if indicated):  [] Tdap (27-32 wks, may be given up to 36 wks if initial window missed): undecided  [] []    [x] Breastfeeding: has PUMP  [x] Postpartum Birth control method: considering PP nexplanon   [] GBS at 36 - 37 wks:  [] 39 weeks discussion of IOL vs. Expectant management: Cherie- has  with [facility]  [] Mode of delivery ( anticipated ):          Relevant Orders    Referral to Physical Therapy    Referral to Chiropractic Medicine - (External)     Other Visit Diagnoses       Bilateral hip pain        Relevant Orders    Referral to Physical Therapy    Referral to Chiropractic Medicine - (External)            Centering Session #4 Plan:   Discussed TDAP - hesitant wants to wait  -The following educational material  was reviewed:  Birth control  Breastfeeding benefits   Family I want to have   -Reviewed reasons to call CNM on-call: decreased fetal movement, vaginal bleeding, loss of fluid, increased pelvic pain/contractions, or any questions/concerns  -RTC in 4 weeks for next Centering visit or prn  next visit:    1:1 total time 10min  Centering Visit total time 120min    Tarah Montes, SIMRAN-CNM

## 2025-04-17 ENCOUNTER — APPOINTMENT (OUTPATIENT)
Dept: OBSTETRICS AND GYNECOLOGY | Facility: CLINIC | Age: 35
End: 2025-04-17
Payer: MEDICARE

## 2025-04-17 VITALS — WEIGHT: 164 LBS | BODY MASS INDEX: 25.69 KG/M2 | SYSTOLIC BLOOD PRESSURE: 112 MMHG | DIASTOLIC BLOOD PRESSURE: 60 MMHG

## 2025-04-17 DIAGNOSIS — F32.A ANXIETY AND DEPRESSION: ICD-10-CM

## 2025-04-17 DIAGNOSIS — O99.612 CONSTIPATION IN PREGNANCY IN SECOND TRIMESTER (HHS-HCC): ICD-10-CM

## 2025-04-17 DIAGNOSIS — Z98.891 HISTORY OF VBAC: ICD-10-CM

## 2025-04-17 DIAGNOSIS — K59.00 CONSTIPATION IN PREGNANCY IN SECOND TRIMESTER (HHS-HCC): ICD-10-CM

## 2025-04-17 DIAGNOSIS — M54.9 BACK PAIN IN PREGNANCY (HHS-HCC): ICD-10-CM

## 2025-04-17 DIAGNOSIS — F41.9 ANXIETY AND DEPRESSION: ICD-10-CM

## 2025-04-17 DIAGNOSIS — O99.891 BACK PAIN IN PREGNANCY (HHS-HCC): ICD-10-CM

## 2025-04-17 DIAGNOSIS — O26.849 FETAL SIZE INCONSISTENT WITH DATES (HHS-HCC): ICD-10-CM

## 2025-04-17 DIAGNOSIS — O09.523 HIGH RISK PREGNANCY, MULTIGRAVIDA OF ADVANCED MATERNAL AGE IN THIRD TRIMESTER (HHS-HCC): Primary | ICD-10-CM

## 2025-04-17 PROCEDURE — 99214 OFFICE O/P EST MOD 30 MIN: CPT | Performed by: OBSTETRICS & GYNECOLOGY

## 2025-04-17 ASSESSMENT — ENCOUNTER SYMPTOMS
MUSCULOSKELETAL NEGATIVE: 0
CARDIOVASCULAR NEGATIVE: 0
NEUROLOGICAL NEGATIVE: 0
ENDOCRINE NEGATIVE: 0
HEMATOLOGIC/LYMPHATIC NEGATIVE: 0
GASTROINTESTINAL NEGATIVE: 0
ALLERGIC/IMMUNOLOGIC NEGATIVE: 0
CONSTITUTIONAL NEGATIVE: 0
EYES NEGATIVE: 0
RESPIRATORY NEGATIVE: 0
PSYCHIATRIC NEGATIVE: 0

## 2025-04-17 NOTE — PROGRESS NOTES
She reports good fetal movement. She is w/o sx of PTL or PEC. She was seen on L&D ~ 2 weeks ago with hemorrhoids and constipation.     A/P: HROB - AMA, anxiety and depression, h/o , h/o HTN in prior preganncy    -  RTC 2 weeks     -  F/U US for growth     -  Fetal movement info    -  Maternity belt for back pain

## 2025-04-18 ENCOUNTER — DOCUMENTATION (OUTPATIENT)
Dept: OBSTETRICS AND GYNECOLOGY | Facility: CLINIC | Age: 35
End: 2025-04-18
Payer: COMMERCIAL

## 2025-04-23 ENCOUNTER — HOSPITAL ENCOUNTER (OUTPATIENT)
Dept: RADIOLOGY | Facility: CLINIC | Age: 35
Discharge: HOME | End: 2025-04-23
Payer: COMMERCIAL

## 2025-04-23 DIAGNOSIS — O09.521 HIGH RISK PREGNANCY, MULTIGRAVIDA OF ADVANCED MATERNAL AGE IN FIRST TRIMESTER (HHS-HCC): ICD-10-CM

## 2025-04-23 DIAGNOSIS — Z03.74 SUSPECTED PROBLEM WITH FETAL GROWTH NOT FOUND: ICD-10-CM

## 2025-04-23 PROCEDURE — 76816 OB US FOLLOW-UP PER FETUS: CPT

## 2025-04-25 ENCOUNTER — APPOINTMENT (OUTPATIENT)
Dept: OBSTETRICS AND GYNECOLOGY | Facility: CLINIC | Age: 35
End: 2025-04-25
Payer: COMMERCIAL

## 2025-04-25 VITALS — WEIGHT: 165.3 LBS | SYSTOLIC BLOOD PRESSURE: 117 MMHG | DIASTOLIC BLOOD PRESSURE: 75 MMHG | BODY MASS INDEX: 25.89 KG/M2

## 2025-04-25 DIAGNOSIS — O34.29 UTERINE SCAR FROM PREVIOUS SURGERY AFFECTING PREGNANCY (HHS-HCC): ICD-10-CM

## 2025-04-25 DIAGNOSIS — O23.42 URINARY TRACT INFECTION IN MOTHER DURING SECOND TRIMESTER OF PREGNANCY (HHS-HCC): ICD-10-CM

## 2025-04-25 DIAGNOSIS — D50.9 IRON DEFICIENCY ANEMIA, UNSPECIFIED IRON DEFICIENCY ANEMIA TYPE: ICD-10-CM

## 2025-04-25 DIAGNOSIS — Z3A.30 30 WEEKS GESTATION OF PREGNANCY (HHS-HCC): Primary | ICD-10-CM

## 2025-04-25 PROBLEM — Z87.59 HISTORY OF POSTPARTUM HEMORRHAGE: Status: ACTIVE | Noted: 2025-04-25

## 2025-04-25 PROCEDURE — 99213 OFFICE O/P EST LOW 20 MIN: CPT | Mod: 25,SB,TH | Performed by: ADVANCED PRACTICE MIDWIFE

## 2025-04-25 PROCEDURE — 99213 OFFICE O/P EST LOW 20 MIN: CPT | Performed by: ADVANCED PRACTICE MIDWIFE

## 2025-04-25 NOTE — PROGRESS NOTES
Subjective   Jd Witt is a 34 y.o.  at 30w1d with a working estimated date of delivery of 7/3/2025, by Last Menstrual Period who presents for Centering #5.     1:1 Visit:   She denies vaginal bleeding, leakage of fluid, or strong/consistent contractions. Endorses good fetal movement.     Objective   Physical Exam:   Weight: 75 kg (165 lb 4.8 oz)  Expected Total Weight Gain: 11.5 kg (25 lb)-16 kg (35 lb)   Pregravid BMI: 19.73  BP: 117/75  Fetal Heart Rate: 158 Fundal Height (cm): 30 cm Presentation: Vertex           Problem List Items Addressed This Visit       Uterine scar from previous surgery affecting pregnancy ([facility])    Overview   G1 Twins primary c/s  G2 !!  MFMU 96.2% prepreg weight 126lb    Plans to TOLAC again!         Urinary tract infection in mother during second trimester of pregnancy ([facility])    Overview   Need UTI PIERRE [  ]  Collected          Relevant Orders    Urine Culture    Anemia    30 weeks gestation of pregnancy ([facility]) - Primary    Overview   Desired provider in labor: [] CNM  [x] Physician  [x] Blood Products: [x] Yes, accepts [] No, needs counseling  [x] Initial BMI: 19.73   [] Prenatal Labs:   [x] Cervical Cancer Screening up to date  [x] Rh status:  O+  [x] Genetic Screening:  declined  [x] NT US: (11-13 wks)  [] Baby ASA (if indicated):  [x] Pregnancy dated by: 6w4d    [x] Anatomy US: (19-20 wks)  [] Federal Sterilization consent signed (if indicated):  [x] 1hr GCT at 24-28wks:  [] [] Fetal Surveillance (if indicated):  [] Tdap (27-32 wks, may be given up to 36 wks if initial window missed): undecided  [] []    [x] Breastfeeding: has PUMP  [x] Postpartum Birth control method: considering PP nexplanon   [] GBS at 36 - 37 wks:  [] 39 weeks discussion of IOL vs. Expectant management: Cherie- has  with [facility]  [] Mode of delivery ( anticipated ):          Relevant Orders    Urine Culture       Centering Sessions #5 Plan:  Urine culture PIERRE  -The following  educational material was reviewed:  Comfort measures and relaxation options during labor  Stages of labor  Birth video  Pain management options in labor  Movement in labor  -Reviewed s/sx of PTL, warning signs, fetal movement counts, and when to call provider  -Return to clinic in 2 weeks for next Centering visit or prn   2 weeks next visit:     1:1 total time 10min  Centering Visit total time 120min    Tarah Montes, APRN-CNM

## 2025-05-01 ENCOUNTER — APPOINTMENT (OUTPATIENT)
Dept: OBSTETRICS AND GYNECOLOGY | Facility: CLINIC | Age: 35
End: 2025-05-01
Payer: MEDICARE

## 2025-05-01 VITALS — WEIGHT: 169 LBS | SYSTOLIC BLOOD PRESSURE: 120 MMHG | BODY MASS INDEX: 26.47 KG/M2 | DIASTOLIC BLOOD PRESSURE: 70 MMHG

## 2025-05-01 DIAGNOSIS — Z98.891 HISTORY OF VBAC: ICD-10-CM

## 2025-05-01 DIAGNOSIS — M54.9 BACK PAIN IN PREGNANCY (HHS-HCC): ICD-10-CM

## 2025-05-01 DIAGNOSIS — O34.29 UTERINE SCAR FROM PREVIOUS SURGERY AFFECTING PREGNANCY (HHS-HCC): ICD-10-CM

## 2025-05-01 DIAGNOSIS — O99.891 BACK PAIN IN PREGNANCY (HHS-HCC): ICD-10-CM

## 2025-05-01 DIAGNOSIS — O09.523 HIGH RISK PREGNANCY, MULTIGRAVIDA OF ADVANCED MATERNAL AGE IN THIRD TRIMESTER (HHS-HCC): Primary | ICD-10-CM

## 2025-05-01 NOTE — PROGRESS NOTES
"She reports good fetal movement.  She is without symptoms of  labor or preeclampsia although she does have occasional episodes of \"lightning crotch \".    She is starting get ready for delivery.  She has met with a .    She is continuing to meet with a therapist and reports that her mood has been a little bit up-and-down but she feels like it has been okay.    A/P: HROB - AMA, anxiety and depression, h/o , h/o HTN in a prior pregnancy    -  Tdap today     -  Fetal movement     -  RTC 2 weeks   "

## 2025-05-02 ENCOUNTER — APPOINTMENT (OUTPATIENT)
Dept: OBSTETRICS AND GYNECOLOGY | Facility: CLINIC | Age: 35
End: 2025-05-02
Payer: COMMERCIAL

## 2025-05-02 ENCOUNTER — TELEPHONE (OUTPATIENT)
Dept: OBSTETRICS AND GYNECOLOGY | Facility: CLINIC | Age: 35
End: 2025-05-02
Payer: COMMERCIAL

## 2025-05-02 NOTE — TELEPHONE ENCOUNTER
Pt verified by name and .  Pt is aware her maternity belt is at the office.  Pt aware she can  during office hours or at her next appt.  Pt has no questions at this time.

## 2025-05-15 ENCOUNTER — APPOINTMENT (OUTPATIENT)
Dept: OBSTETRICS AND GYNECOLOGY | Facility: CLINIC | Age: 35
End: 2025-05-15
Payer: MEDICARE

## 2025-05-15 VITALS — BODY MASS INDEX: 27.25 KG/M2 | SYSTOLIC BLOOD PRESSURE: 90 MMHG | DIASTOLIC BLOOD PRESSURE: 62 MMHG | WEIGHT: 174 LBS

## 2025-05-15 DIAGNOSIS — O34.29 UTERINE SCAR FROM PREVIOUS SURGERY AFFECTING PREGNANCY (HHS-HCC): ICD-10-CM

## 2025-05-15 DIAGNOSIS — O09.523 HIGH RISK PREGNANCY, MULTIGRAVIDA OF ADVANCED MATERNAL AGE IN THIRD TRIMESTER (HHS-HCC): Primary | ICD-10-CM

## 2025-05-15 DIAGNOSIS — Z98.891 HISTORY OF VBAC: ICD-10-CM

## 2025-05-15 PROCEDURE — 99214 OFFICE O/P EST MOD 30 MIN: CPT | Performed by: OBSTETRICS & GYNECOLOGY

## 2025-05-15 NOTE — PROGRESS NOTES
She reports good FM and is w/o sx of PTL or PEC.     She got engaged on Mothers' Day.    A/P: HROB - AMA, h/o , anxiety ad depression    -  RTC 2 weeks     -  Fetal movement    -  Mammogram is available in the office

## 2025-05-16 ENCOUNTER — ROUTINE PRENATAL (OUTPATIENT)
Dept: OBSTETRICS AND GYNECOLOGY | Facility: CLINIC | Age: 35
End: 2025-05-16
Payer: COMMERCIAL

## 2025-05-16 VITALS
BODY MASS INDEX: 27.51 KG/M2 | WEIGHT: 175.66 LBS | SYSTOLIC BLOOD PRESSURE: 107 MMHG | DIASTOLIC BLOOD PRESSURE: 76 MMHG

## 2025-05-16 DIAGNOSIS — Z3A.33 33 WEEKS GESTATION OF PREGNANCY (HHS-HCC): ICD-10-CM

## 2025-05-16 DIAGNOSIS — O34.29 UTERINE SCAR FROM PREVIOUS SURGERY AFFECTING PREGNANCY (HHS-HCC): ICD-10-CM

## 2025-05-16 DIAGNOSIS — O23.42 URINARY TRACT INFECTION IN MOTHER DURING SECOND TRIMESTER OF PREGNANCY (HHS-HCC): Primary | ICD-10-CM

## 2025-05-16 PROCEDURE — 99213 OFFICE O/P EST LOW 20 MIN: CPT | Mod: 25,SB,TH | Performed by: ADVANCED PRACTICE MIDWIFE

## 2025-05-16 NOTE — PROGRESS NOTES
Subjective   Jd Witt is a 34 y.o.  at 33w1d with a working estimated date of delivery of 7/3/2025, by Last Menstrual Period who presents for Centering #6.     1:1 Visit:   She denies vaginal bleeding, leakage of fluid, or strong/consistent contractions. Endorses good fetal movement.     Objective   Physical Exam:   Weight: 79.7 kg (175 lb 10.6 oz)  Expected Total Weight Gain: 11.5 kg (25 lb)-16 kg (35 lb)   Pregravid BMI: 19.73  BP: 107/76  Fetal Heart Rate: 153 Fundal Height (cm): 30 cm Presentation: Vertex           Problem List Items Addressed This Visit       Uterine scar from previous surgery affecting pregnancy ([facility])    Overview   G1 Twins primary c/s  G2 !!  MFMU 96.2% prepreg weight 126lb    Plans to TOLAC again!         Urinary tract infection in mother during second trimester of pregnancy ([facility]) - Primary    Overview   Need UTI PIERRE [  ]  Collected  contaminated   [  ] recollect PIERRE         33 weeks gestation of pregnancy ([facility])    Overview   Desired provider in labor: [] CNM  [x] Physician  [x] Blood Products: [x] Yes, accepts [] No, needs counseling  [x] Initial BMI: 19.73   [x] Prenatal Labs: done   [x] Cervical Cancer Screening up to date  [x] Rh status:  O+  [x] Genetic Screening:  declined  [x] NT US: (11-13 wks)  [] Baby ASA (if indicated):  [x] Pregnancy dated by: 6w4d    [x] Anatomy US: (19-20 wks)  [] Federal Sterilization consent signed (if indicated):  [x] 1hr GCT at 24-28wks:  [] [] Fetal Surveillance (if indicated):  [x] Tdap (27-32 wks, may be given up to 36 wks if initial given window missed): given   [] []    [x] Breastfeeding: has PUMP  [x] Postpartum Birth control method: considering PP nexplanon   [] GBS at 36 - 37 wks:  [] 39 weeks discussion of IOL vs. Expectant management: Cherie- has tirso with [facility]  [x] Mode of delivery ( anticipated ):             Centering Sessions #6 Plan:  Reviewed lab results    Reviewed growth US    -The following  educational material was reviewed:  Labor   Pain management options in labor  Induction options/ methods   Immediate postpartum care   -Reviewed s/sx of PTL, warning signs, fetal movement counts, and when to call provider  -Return to clinic in 2 weeks for next Centering visit or prn   next visit:     1:1 total time 10min  Centering Visit total time 120min    Tarah Montes, APRN-CNM

## 2025-05-30 ENCOUNTER — ROUTINE PRENATAL (OUTPATIENT)
Dept: OBSTETRICS AND GYNECOLOGY | Facility: CLINIC | Age: 35
End: 2025-05-30
Payer: COMMERCIAL

## 2025-05-30 VITALS — BODY MASS INDEX: 28.21 KG/M2 | SYSTOLIC BLOOD PRESSURE: 136 MMHG | WEIGHT: 180.1 LBS | DIASTOLIC BLOOD PRESSURE: 81 MMHG

## 2025-05-30 DIAGNOSIS — O23.42 URINARY TRACT INFECTION IN MOTHER DURING SECOND TRIMESTER OF PREGNANCY (HHS-HCC): ICD-10-CM

## 2025-05-30 DIAGNOSIS — O34.29 UTERINE SCAR FROM PREVIOUS SURGERY AFFECTING PREGNANCY (HHS-HCC): ICD-10-CM

## 2025-05-30 DIAGNOSIS — O26.843 UTERINE SIZE-DATE DISCREPANCY IN THIRD TRIMESTER (HHS-HCC): ICD-10-CM

## 2025-05-30 DIAGNOSIS — O26.813 PREGNANCY RELATED FATIGUE IN THIRD TRIMESTER (HHS-HCC): ICD-10-CM

## 2025-05-30 DIAGNOSIS — Z87.59 HISTORY OF GESTATIONAL HYPERTENSION: ICD-10-CM

## 2025-05-30 DIAGNOSIS — Z87.59 HISTORY OF POSTPARTUM HEMORRHAGE: ICD-10-CM

## 2025-05-30 DIAGNOSIS — R45.89 DEPRESSED MOOD: ICD-10-CM

## 2025-05-30 DIAGNOSIS — Z3A.35 35 WEEKS GESTATION OF PREGNANCY (HHS-HCC): Primary | ICD-10-CM

## 2025-05-30 DIAGNOSIS — F41.9 ANXIETY: ICD-10-CM

## 2025-05-30 DIAGNOSIS — Z65.8 PSYCHOSOCIAL STRESSORS: ICD-10-CM

## 2025-05-30 PROCEDURE — 99213 OFFICE O/P EST LOW 20 MIN: CPT | Performed by: ADVANCED PRACTICE MIDWIFE

## 2025-05-30 PROCEDURE — 99213 OFFICE O/P EST LOW 20 MIN: CPT | Mod: 25,SB,TH | Performed by: ADVANCED PRACTICE MIDWIFE

## 2025-05-30 PROCEDURE — 99078 GROUP HEALTH EDUCATION: CPT | Performed by: ADVANCED PRACTICE MIDWIFE

## 2025-05-30 PROCEDURE — H1002 CARECOORDINATION PRENATAL: HCPCS | Performed by: ADVANCED PRACTICE MIDWIFE

## 2025-05-30 ASSESSMENT — EDINBURGH POSTNATAL DEPRESSION SCALE (EPDS)
I HAVE BEEN SO UNHAPPY THAT I HAVE HAD DIFFICULTY SLEEPING: NOT AT ALL
THE THOUGHT OF HARMING MYSELF HAS OCCURRED TO ME: NEVER
THINGS HAVE BEEN GETTING ON TOP OF ME: NO, MOST OF THE TIME I HAVE COPED QUITE WELL
I HAVE BEEN ABLE TO LAUGH AND SEE THE FUNNY SIDE OF THINGS: AS MUCH AS I ALWAYS COULD
I HAVE FELT SCARED OR PANICKY FOR NO GOOD REASON: NO, NOT AT ALL
I HAVE FELT SAD OR MISERABLE: NOT VERY OFTEN
I HAVE BEEN ANXIOUS OR WORRIED FOR NO GOOD REASON: YES, SOMETIMES
TOTAL SCORE: 5
I HAVE BLAMED MYSELF UNNECESSARILY WHEN THINGS WENT WRONG: NO, NEVER
I HAVE LOOKED FORWARD WITH ENJOYMENT TO THINGS: AS MUCH AS I EVER DID
I HAVE BEEN SO UNHAPPY THAT I HAVE BEEN CRYING: ONLY OCCASIONALLY
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
## 2025-06-01 LAB — BACTERIA UR CULT: NORMAL

## 2025-06-04 ENCOUNTER — HOSPITAL ENCOUNTER (OUTPATIENT)
Dept: RADIOLOGY | Facility: CLINIC | Age: 35
Discharge: HOME | End: 2025-06-04
Payer: COMMERCIAL

## 2025-06-04 DIAGNOSIS — Z3A.35 35 WEEKS GESTATION OF PREGNANCY (HHS-HCC): ICD-10-CM

## 2025-06-04 DIAGNOSIS — O09.529 AMA (ADVANCED MATERNAL AGE) MULTIGRAVIDA 35+ (HHS-HCC): ICD-10-CM

## 2025-06-04 DIAGNOSIS — O26.843 UTERINE SIZE-DATE DISCREPANCY IN THIRD TRIMESTER (HHS-HCC): ICD-10-CM

## 2025-06-04 PROCEDURE — 76816 OB US FOLLOW-UP PER FETUS: CPT

## 2025-06-05 ENCOUNTER — APPOINTMENT (OUTPATIENT)
Dept: OBSTETRICS AND GYNECOLOGY | Facility: CLINIC | Age: 35
End: 2025-06-05
Payer: COMMERCIAL

## 2025-06-05 VITALS — BODY MASS INDEX: 28.19 KG/M2 | SYSTOLIC BLOOD PRESSURE: 120 MMHG | WEIGHT: 180 LBS | DIASTOLIC BLOOD PRESSURE: 80 MMHG

## 2025-06-05 DIAGNOSIS — Z3A.36 36 WEEKS GESTATION OF PREGNANCY (HHS-HCC): ICD-10-CM

## 2025-06-05 PROCEDURE — 99214 OFFICE O/P EST MOD 30 MIN: CPT | Performed by: OBSTETRICS & GYNECOLOGY

## 2025-06-07 LAB — GP B STREP SPEC QL CULT: ABNORMAL

## 2025-06-10 NOTE — PROGRESS NOTES
She reports good FM and is w/o sx of PTL or PEC. She reports that her mood is ok and she is getting for delivery.     She is trying to decide if she desires a repeat  or repeat CD.     A/P: HROB - AMA, prior , anxiety and depression    -  Fetal movement     -  Tdap at next visit     -  RTC Q week     -  Briefly d/w the patient IOL

## 2025-06-13 ENCOUNTER — ROUTINE PRENATAL (OUTPATIENT)
Dept: OBSTETRICS AND GYNECOLOGY | Facility: CLINIC | Age: 35
End: 2025-06-13
Payer: COMMERCIAL

## 2025-06-13 VITALS — BODY MASS INDEX: 28.61 KG/M2 | SYSTOLIC BLOOD PRESSURE: 104 MMHG | DIASTOLIC BLOOD PRESSURE: 74 MMHG | WEIGHT: 182.7 LBS

## 2025-06-13 DIAGNOSIS — O28.5 ABNORMAL CHROMOSOMAL AND GENETIC FINDING ON ANTENATAL SCREENING OF MOTHER: ICD-10-CM

## 2025-06-13 DIAGNOSIS — Z87.59 HISTORY OF POSTPARTUM HEMORRHAGE: ICD-10-CM

## 2025-06-13 DIAGNOSIS — Z3A.37 37 WEEKS GESTATION OF PREGNANCY (HHS-HCC): Primary | ICD-10-CM

## 2025-06-13 DIAGNOSIS — Z87.59 HISTORY OF GESTATIONAL HYPERTENSION: ICD-10-CM

## 2025-06-13 DIAGNOSIS — O34.29 UTERINE SCAR FROM PREVIOUS SURGERY AFFECTING PREGNANCY (HHS-HCC): ICD-10-CM

## 2025-06-13 DIAGNOSIS — F41.9 ANXIETY: ICD-10-CM

## 2025-06-13 DIAGNOSIS — R45.89 DEPRESSED MOOD: ICD-10-CM

## 2025-06-13 PROCEDURE — 99213 OFFICE O/P EST LOW 20 MIN: CPT | Mod: 25,SB,TH | Performed by: ADVANCED PRACTICE MIDWIFE

## 2025-06-13 PROCEDURE — 99213 OFFICE O/P EST LOW 20 MIN: CPT | Performed by: ADVANCED PRACTICE MIDWIFE

## 2025-06-13 PROCEDURE — 99078 GROUP HEALTH EDUCATION: CPT | Performed by: ADVANCED PRACTICE MIDWIFE

## 2025-06-13 NOTE — PROGRESS NOTES
Subjective   Jd Witt is a 34 y.o.  at 37w1d with a working estimated date of delivery of 7/3/2025, by Last Menstrual Period who presents for Centering visit #8.     1:1 Visit:   She denies vaginal bleeding, leakage of fluid, or strong/consistent contractions. Endorses good fetal movement.     Postpartum Depression: Medium Risk (2025)    [place]  Depression Scale     Last EPDS Total Score: 5     Last EPDS Self Harm Result: Never       Objective   Physical Exam:   Weight: 82.9 kg (182 lb 11.2 oz)  Expected Total Weight Gain: 11.5 kg (25 lb)-16 kg (35 lb)   Pregravid BMI: 19.73  BP: 104/74  Fetal Heart Rate: 147   Presentation: Vertex  Dilation: Fingertip Effacement (%): 50 Fetal Station: Ballotable    Problem List Items Addressed This Visit       37 weeks gestation of pregnancy ([facility]) - Primary    Overview   Desired provider in labor: [] CNM  [x] Physician  [x] Blood Products: [x] Yes, accepts [] No, needs counseling  [x] Initial BMI: 19.73   [x] Prenatal Labs: done   [x] Cervical Cancer Screening up to date  [x] Rh status:  O+  [x] Genetic Screening:  declined  [x] NT US: (11-13 wks)  [] Baby ASA (if indicated):  [x] Pregnancy dated by: 6w4d    [x] Anatomy US: (19-20 wks)  [] Federal Sterilization consent signed (if indicated):  [x] 1hr GCT at 24-28wks:  [] [] Fetal Surveillance (if indicated): 34w growth wnl efw 84%  [x] Tdap (27-32 wks, may be given up to 36 wks if initial given window missed): given   [] []    [x] Breastfeeding: has PUMP  [x] Postpartum Birth control method: considering PP nexplanon   [x] GBS at 36 - 37 wks: pending   [x] 39 weeks discussion of IOL vs. Expectant management: Vivienne chahal with [facility]  [x] Mode of delivery ( anticipated ):          Abnormal chromosomal and genetic finding on  screening of mother    Anxiety    Depressed mood    Overview   Tearful during belly check  Needs a therapist per pit  [X] referral placed  Discussing  further with Savanah following session MSW    Going to [facility] for counseling  [  ] possibly interested in zurzuvae pp         History of gestational hypertension    Overview   In previous pregnancy   [  ]bASA         History of postpartum hemorrhage    Overview   History of blood transfusion          Uterine scar from previous surgery affecting pregnancy ([facility])    Overview   G1 Twins primary c/s  G2 !!  MFMU 96.2% prepreg weight 126lb    Plans to TOLAC again!            Centering Sessions #8 Plan:   Discussed expectant management vs. scheduling term IOL,    -The following educational material was reviewed:  Pregnancy to Parenting transition  Emotional adjustments, baby blues vs. PP depression  -Reviewed s/sx of labor, warning signs, fetal movement counts, and when to call provider  -Follow up in 2 weeks for next Centering visit or prn   next visit:     1:1 total time 10min  Centering Visit total time 120min    Tarah Montes, SIMRAN-ALISTAIR

## 2025-06-16 ENCOUNTER — HOSPITAL ENCOUNTER (OUTPATIENT)
Facility: HOSPITAL | Age: 35
Discharge: HOME | End: 2025-06-16
Attending: PEDIATRICS | Admitting: PEDIATRICS
Payer: COMMERCIAL

## 2025-06-16 VITALS
SYSTOLIC BLOOD PRESSURE: 92 MMHG | HEIGHT: 67 IN | BODY MASS INDEX: 28.51 KG/M2 | HEART RATE: 101 BPM | RESPIRATION RATE: 16 BRPM | WEIGHT: 181.66 LBS | TEMPERATURE: 97.9 F | DIASTOLIC BLOOD PRESSURE: 50 MMHG | OXYGEN SATURATION: 97 %

## 2025-06-16 LAB
ALBUMIN SERPL BCP-MCNC: 3.6 G/DL (ref 3.4–5)
ALP SERPL-CCNC: 94 U/L (ref 33–110)
ALT SERPL W P-5'-P-CCNC: 20 U/L (ref 7–45)
ANION GAP SERPL CALC-SCNC: 15 MMOL/L (ref 10–20)
AST SERPL W P-5'-P-CCNC: 36 U/L (ref 9–39)
BILIRUB SERPL-MCNC: 0.6 MG/DL (ref 0–1.2)
BUN SERPL-MCNC: 7 MG/DL (ref 6–23)
CALCIUM SERPL-MCNC: 9.1 MG/DL (ref 8.6–10.3)
CHLORIDE SERPL-SCNC: 105 MMOL/L (ref 98–107)
CO2 SERPL-SCNC: 19 MMOL/L (ref 21–32)
CREAT SERPL-MCNC: 0.46 MG/DL (ref 0.5–1.05)
CREAT UR-MCNC: 42.5 MG/DL (ref 20–320)
EGFRCR SERPLBLD CKD-EPI 2021: >90 ML/MIN/1.73M*2
ERYTHROCYTE [DISTWIDTH] IN BLOOD BY AUTOMATED COUNT: 13.8 % (ref 11.5–14.5)
GLUCOSE SERPL-MCNC: 93 MG/DL (ref 74–99)
HCT VFR BLD AUTO: 31.6 % (ref 36–46)
HGB BLD-MCNC: 10.5 G/DL (ref 12–16)
LDH SERPL L TO P-CCNC: 173 U/L (ref 84–246)
MCH RBC QN AUTO: 29.2 PG (ref 26–34)
MCHC RBC AUTO-ENTMCNC: 33.2 G/DL (ref 32–36)
MCV RBC AUTO: 88 FL (ref 80–100)
NRBC BLD-RTO: 0 /100 WBCS (ref 0–0)
PLATELET # BLD AUTO: 147 X10*3/UL (ref 150–450)
POTASSIUM SERPL-SCNC: 4.1 MMOL/L (ref 3.5–5.3)
PROT SERPL-MCNC: 6.3 G/DL (ref 6.4–8.2)
PROT UR-ACNC: 9 MG/DL (ref 5–24)
PROT/CREAT UR: 0.21 MG/MG CREAT (ref 0–0.17)
RBC # BLD AUTO: 3.6 X10*6/UL (ref 4–5.2)
SODIUM SERPL-SCNC: 135 MMOL/L (ref 136–145)
WBC # BLD AUTO: 4.3 X10*3/UL (ref 4.4–11.3)

## 2025-06-16 PROCEDURE — 99215 OFFICE O/P EST HI 40 MIN: CPT

## 2025-06-16 PROCEDURE — 80053 COMPREHEN METABOLIC PANEL: CPT | Performed by: OBSTETRICS & GYNECOLOGY

## 2025-06-16 PROCEDURE — 82570 ASSAY OF URINE CREATININE: CPT | Performed by: OBSTETRICS & GYNECOLOGY

## 2025-06-16 PROCEDURE — 99213 OFFICE O/P EST LOW 20 MIN: CPT | Performed by: OBSTETRICS & GYNECOLOGY

## 2025-06-16 PROCEDURE — 85027 COMPLETE CBC AUTOMATED: CPT | Performed by: OBSTETRICS & GYNECOLOGY

## 2025-06-16 PROCEDURE — 36415 COLL VENOUS BLD VENIPUNCTURE: CPT

## 2025-06-16 PROCEDURE — 83615 LACTATE (LD) (LDH) ENZYME: CPT | Performed by: OBSTETRICS & GYNECOLOGY

## 2025-06-16 PROCEDURE — 59025 FETAL NON-STRESS TEST: CPT | Performed by: OBSTETRICS & GYNECOLOGY

## 2025-06-16 PROCEDURE — 2500000001 HC RX 250 WO HCPCS SELF ADMINISTERED DRUGS (ALT 637 FOR MEDICARE OP): Performed by: OBSTETRICS & GYNECOLOGY

## 2025-06-16 PROCEDURE — 36415 COLL VENOUS BLD VENIPUNCTURE: CPT | Performed by: OBSTETRICS & GYNECOLOGY

## 2025-06-16 RX ORDER — HYDRALAZINE HYDROCHLORIDE 20 MG/ML
5 INJECTION INTRAMUSCULAR; INTRAVENOUS ONCE AS NEEDED
Status: DISCONTINUED | OUTPATIENT
Start: 2025-06-16 | End: 2025-06-16 | Stop reason: HOSPADM

## 2025-06-16 RX ORDER — ACETAMINOPHEN 325 MG/1
975 TABLET ORAL ONCE
Status: COMPLETED | OUTPATIENT
Start: 2025-06-16 | End: 2025-06-16

## 2025-06-16 RX ORDER — LABETALOL HYDROCHLORIDE 5 MG/ML
20 INJECTION, SOLUTION INTRAVENOUS ONCE AS NEEDED
Status: DISCONTINUED | OUTPATIENT
Start: 2025-06-16 | End: 2025-06-16 | Stop reason: HOSPADM

## 2025-06-16 RX ORDER — DIPHENHYDRAMINE HCL 25 MG
25 CAPSULE ORAL ONCE
Status: COMPLETED | OUTPATIENT
Start: 2025-06-16 | End: 2025-06-16

## 2025-06-16 RX ORDER — METOCLOPRAMIDE HYDROCHLORIDE 5 MG/ML
10 INJECTION INTRAMUSCULAR; INTRAVENOUS ONCE
Status: COMPLETED | OUTPATIENT
Start: 2025-06-16 | End: 2025-06-16

## 2025-06-16 RX ORDER — METOCLOPRAMIDE 10 MG/1
10 TABLET ORAL ONCE
Status: COMPLETED | OUTPATIENT
Start: 2025-06-16 | End: 2025-06-16

## 2025-06-16 RX ORDER — ONDANSETRON 4 MG/1
4 TABLET, FILM COATED ORAL EVERY 6 HOURS PRN
Status: DISCONTINUED | OUTPATIENT
Start: 2025-06-16 | End: 2025-06-16 | Stop reason: HOSPADM

## 2025-06-16 RX ORDER — ACETAMINOPHEN 650 MG/1
650 SUPPOSITORY RECTAL ONCE
Status: COMPLETED | OUTPATIENT
Start: 2025-06-16 | End: 2025-06-16

## 2025-06-16 RX ORDER — DIPHENHYDRAMINE HYDROCHLORIDE 50 MG/ML
25 INJECTION, SOLUTION INTRAMUSCULAR; INTRAVENOUS ONCE
Status: COMPLETED | OUTPATIENT
Start: 2025-06-16 | End: 2025-06-16

## 2025-06-16 RX ORDER — ACETAMINOPHEN 160 MG/5ML
650 SOLUTION ORAL ONCE
Status: COMPLETED | OUTPATIENT
Start: 2025-06-16 | End: 2025-06-16

## 2025-06-16 RX ORDER — ONDANSETRON HYDROCHLORIDE 2 MG/ML
4 INJECTION, SOLUTION INTRAVENOUS EVERY 6 HOURS PRN
Status: DISCONTINUED | OUTPATIENT
Start: 2025-06-16 | End: 2025-06-16 | Stop reason: HOSPADM

## 2025-06-16 RX ORDER — LIDOCAINE HYDROCHLORIDE 10 MG/ML
0.5 INJECTION, SOLUTION EPIDURAL; INFILTRATION; INTRACAUDAL; PERINEURAL ONCE AS NEEDED
Status: DISCONTINUED | OUTPATIENT
Start: 2025-06-16 | End: 2025-06-16 | Stop reason: HOSPADM

## 2025-06-16 RX ADMIN — DIPHENHYDRAMINE HYDROCHLORIDE 25 MG: 25 CAPSULE ORAL at 13:05

## 2025-06-16 RX ADMIN — ACETAMINOPHEN 975 MG: 325 TABLET ORAL at 13:05

## 2025-06-16 RX ADMIN — METOCLOPRAMIDE 10 MG: 10 TABLET ORAL at 13:05

## 2025-06-16 SDOH — HEALTH STABILITY: MENTAL HEALTH: SUICIDAL BEHAVIOR (LIFETIME): NO

## 2025-06-16 SDOH — HEALTH STABILITY: MENTAL HEALTH: WISH TO BE DEAD (PAST 1 MONTH): NO

## 2025-06-16 SDOH — ECONOMIC STABILITY: HOUSING INSECURITY: DO YOU FEEL UNSAFE GOING BACK TO THE PLACE WHERE YOU ARE LIVING?: NO

## 2025-06-16 SDOH — SOCIAL STABILITY: SOCIAL INSECURITY: PHYSICAL ABUSE: DENIES

## 2025-06-16 SDOH — SOCIAL STABILITY: SOCIAL INSECURITY: HAS ANYONE EVER THREATENED TO HURT YOUR FAMILY OR YOUR PETS?: NO

## 2025-06-16 SDOH — HEALTH STABILITY: MENTAL HEALTH: NON-SPECIFIC ACTIVE SUICIDAL THOUGHTS (PAST 1 MONTH): NO

## 2025-06-16 SDOH — SOCIAL STABILITY: SOCIAL INSECURITY: ARE YOU OR HAVE YOU BEEN THREATENED OR ABUSED PHYSICALLY, EMOTIONALLY, OR SEXUALLY BY ANYONE?: NO

## 2025-06-16 SDOH — SOCIAL STABILITY: SOCIAL INSECURITY: ABUSE SCREEN: ADULT

## 2025-06-16 SDOH — SOCIAL STABILITY: SOCIAL INSECURITY: DOES ANYONE TRY TO KEEP YOU FROM HAVING/CONTACTING OTHER FRIENDS OR DOING THINGS OUTSIDE YOUR HOME?: NO

## 2025-06-16 SDOH — SOCIAL STABILITY: SOCIAL INSECURITY: ARE THERE ANY APPARENT SIGNS OF INJURIES/BEHAVIORS THAT COULD BE RELATED TO ABUSE/NEGLECT?: NO

## 2025-06-16 SDOH — SOCIAL STABILITY: SOCIAL INSECURITY: HAVE YOU HAD ANY THOUGHTS OF HARMING ANYONE ELSE?: NO

## 2025-06-16 SDOH — SOCIAL STABILITY: SOCIAL INSECURITY: HAVE YOU HAD THOUGHTS OF HARMING ANYONE ELSE?: NO

## 2025-06-16 SDOH — HEALTH STABILITY: MENTAL HEALTH: WERE YOU ABLE TO COMPLETE ALL THE BEHAVIORAL HEALTH SCREENINGS?: YES

## 2025-06-16 SDOH — SOCIAL STABILITY: SOCIAL INSECURITY: VERBAL ABUSE: DENIES

## 2025-06-16 SDOH — SOCIAL STABILITY: SOCIAL INSECURITY: DO YOU FEEL ANYONE HAS EXPLOITED OR TAKEN ADVANTAGE OF YOU FINANCIALLY OR OF YOUR PERSONAL PROPERTY?: NO

## 2025-06-16 ASSESSMENT — LIFESTYLE VARIABLES
HOW OFTEN DO YOU HAVE A DRINK CONTAINING ALCOHOL: NEVER
SKIP TO QUESTIONS 9-10: 1
AUDIT-C TOTAL SCORE: 0
HOW OFTEN DO YOU HAVE 6 OR MORE DRINKS ON ONE OCCASION: NEVER
HOW MANY STANDARD DRINKS CONTAINING ALCOHOL DO YOU HAVE ON A TYPICAL DAY: PATIENT DOES NOT DRINK
AUDIT-C TOTAL SCORE: 0

## 2025-06-16 ASSESSMENT — PATIENT HEALTH QUESTIONNAIRE - PHQ9
2. FEELING DOWN, DEPRESSED OR HOPELESS: NOT AT ALL
1. LITTLE INTEREST OR PLEASURE IN DOING THINGS: NOT AT ALL
SUM OF ALL RESPONSES TO PHQ9 QUESTIONS 1 & 2: 0

## 2025-06-16 ASSESSMENT — PAIN SCALES - GENERAL
PAINLEVEL_OUTOF10: 0 - NO PAIN
PAINLEVEL_OUTOF10: 6

## 2025-06-16 NOTE — DISCHARGE INSTRUCTIONS
Keep next scheduled appointment.   Call or return to triage with any worsening headaches, vision changes, right sided abdominal pain.  Ok for Tylenol every 4 hours as needed.  Monitor fetal movements and call with any concerns for decreased movement

## 2025-06-16 NOTE — SIGNIFICANT EVENT
Pt sleeping soundly.  BP's have been normal range  Labs with plts of 147k, otherwise normal w regards to PEC.    HA improved per nursing. Pt comfortable going home. Precautions reviewed.

## 2025-06-16 NOTE — H&P
OB Triage H&P    Assessment/Plan    Jd Witt is a 34 y.o.  at 37w4d, LIANET: 7/3/2025, by Last Menstrual Period, who presents to triage with headache and elevated BP at home.    Plan  PEC labs, BP's, treat headache  -Fetal monitoring reassuring  -Good fetal movement  -Up to date on prenatal care  -Continue routine prenatal care    Dispo  Pending labs and resolution of headache        Pregnancy Problems (from 24 to present)       Problem Noted Diagnosed Resolved    History of postpartum hemorrhage 2025 by MARIEL Sykes  No    Priority:  Medium       Overview Signed 2025  2:26 PM by MARIEL Sykes   History of blood transfusion          Urinary tract infection in mother during second trimester of pregnancy ([facility]) 3/21/2025 by MARIEL Sykes  No    Priority:  Medium       Overview Addendum 2025  8:09 AM by MARIEL Cee   Need UTI PIERRE [  ]  Collected  contaminated   [x] recollect PIERRE, WNL         History of gestational hypertension 2025 by MARIEL Sykes  No    Priority:  Medium       Overview Signed 2025 10:44 AM by MARIEL Sykes   In previous pregnancy   [  ]bASA         37 weeks gestation of pregnancy ([facility]) 2025 by MARIEL Sykes  No    Priority:  Medium       Overview Addendum 2025  2:45 PM by MARIEL Sykes   Desired provider in labor: [] CNM  [x] Physician  [x] Blood Products: [x] Yes, accepts [] No, needs counseling  [x] Initial BMI: 19.73   [x] Prenatal Labs: done   [x] Cervical Cancer Screening up to date  [x] Rh status:  O+  [x] Genetic Screening:  declined  [x] NT US: (11-13 wks)  [] Baby ASA (if indicated):  [x] Pregnancy dated by: 6w4d    [x] Anatomy US: (19-20 wks)  [] Federal Sterilization consent signed (if indicated):  [x] 1hr GCT at 24-28wks:  [] [] Fetal Surveillance (if indicated): 34w growth wnl  efw 84%  [x] Tdap (27-32 wks, may be given up to 36 wks if initial given window missed): given   [] []    [x] Breastfeeding: has PUMP  [x] Postpartum Birth control method: considering PP nexplanon   [x] GBS at 36 - 37 wks: pending   [x] 39 weeks discussion of IOL vs. Expectant management: Vivienne chahal with [facility]  [x] Mode of delivery ( anticipated ):                  Subjective   Good fetal movement.  Denies vaginal bleeding., C/O of occasional contractions., Denies leaking of fluid.  Pt with history of PEC in last pregnancy. Also has a history of migraine headaches which had been in remission but started again last evening. No visual changes, RUQ pain, N/V or significant edema. Pt did not have Tylenol at home and has therefore only taken B6. BP at home was 130's/89.    Prenatal Provider Emmett    OB History    Para Term  AB Living   3 2 2 0 0 3   SAB IAB Ectopic Multiple Live Births   0 0 0 1 3      # Outcome Date GA Lbr Fernie/2nd Weight Sex Type Anes PTL Lv   3 Current            2 Term 20     Vag-Spont   GRACIELA   1A Term 17    M CS-LTranv   GRACIELA   1B Term 17    M CS-LVertical   GRACIELA       Surgical History[1]    Social History     Tobacco Use    Smoking status: Never    Smokeless tobacco: Never   Substance Use Topics    Alcohol use: Not Currently       Allergies[2]    Prescriptions Prior to Admission[3]  Objective     Last Vitals  Temp Pulse Resp BP MAP O2 Sat   36.6 °C (97.9 °F) (!) 112 16 121/70 88 97 %     Blood Pressures         2025  1220             BP: 121/70             Physical Exam  General: NAD, mood appropriate  Cardiopulmonary: warm and well perfused, breathing comfortably on room air  Abdomen: Gravid, non-tender  Extremities: Symmetric  Speculum Exam: deferred  Cervix: N/E    Chaperone Present: Yes.  Chaperone Name/Title: Vernell MARIN  Examination Chaperoned: Entire Physical Exam     Fetal Monitoring  Baseline: 130 bpm, Variability: moderate,  Accelerations: present  and Decelerations: none  Uterine Activity: Irregular contractions  Interpretation: Reactive    Bedside ultrasound: No    Labs in chart were reviewed.          Prenatal labs reviewed, not remarkable.             [1]   Past Surgical History:  Procedure Laterality Date     SECTION, LOW TRANSVERSE  2017     Section Low Transverse    DILATION AND CURETTAGE OF UTERUS      UMBILICAL HERNIA REPAIR     [2] No Known Allergies  [3]   Medications Prior to Admission   Medication Sig Dispense Refill Last Dose/Taking    prenatal vitamin, iron-folic, 27 mg iron-800 mcg folic acid tablet Take 1 tablet by mouth once daily. 90 tablet 3 2025    docusate sodium (Colace) 100 mg capsule Take 1 capsule (100 mg) by mouth 2 times a day. 30 capsule 5

## 2025-06-18 ENCOUNTER — LAB (OUTPATIENT)
Dept: LAB | Facility: HOSPITAL | Age: 35
End: 2025-06-18
Payer: COMMERCIAL

## 2025-06-18 ENCOUNTER — TELEPHONE (OUTPATIENT)
Dept: OBSTETRICS AND GYNECOLOGY | Facility: CLINIC | Age: 35
End: 2025-06-18

## 2025-06-18 ENCOUNTER — APPOINTMENT (OUTPATIENT)
Dept: OBSTETRICS AND GYNECOLOGY | Facility: CLINIC | Age: 35
End: 2025-06-18
Payer: COMMERCIAL

## 2025-06-18 VITALS — BODY MASS INDEX: 28.82 KG/M2 | DIASTOLIC BLOOD PRESSURE: 76 MMHG | WEIGHT: 184 LBS | SYSTOLIC BLOOD PRESSURE: 134 MMHG

## 2025-06-18 DIAGNOSIS — O09.523 HIGH RISK PREGNANCY, MULTIGRAVIDA OF ADVANCED MATERNAL AGE IN THIRD TRIMESTER (HHS-HCC): Primary | ICD-10-CM

## 2025-06-18 DIAGNOSIS — F41.9 ANXIETY: ICD-10-CM

## 2025-06-18 DIAGNOSIS — Z87.59 HISTORY OF GESTATIONAL HYPERTENSION: ICD-10-CM

## 2025-06-18 DIAGNOSIS — Z87.59 HISTORY OF POSTPARTUM HEMORRHAGE: ICD-10-CM

## 2025-06-18 DIAGNOSIS — Z98.891 HISTORY OF VBAC: ICD-10-CM

## 2025-06-18 DIAGNOSIS — Z34.90 ENCOUNTER FOR INDUCTION OF LABOR: ICD-10-CM

## 2025-06-18 LAB
ERYTHROCYTE [DISTWIDTH] IN BLOOD BY AUTOMATED COUNT: 14 % (ref 11.5–14.5)
HCT VFR BLD AUTO: 35.9 % (ref 36–46)
HGB BLD-MCNC: 11 G/DL (ref 12–16)
MCH RBC QN AUTO: 28.3 PG (ref 26–34)
MCHC RBC AUTO-ENTMCNC: 30.6 G/DL (ref 32–36)
MCV RBC AUTO: 92 FL (ref 80–100)
NRBC BLD-RTO: 0.4 /100 WBCS (ref 0–0)
PLATELET # BLD AUTO: 161 X10*3/UL (ref 150–450)
RBC # BLD AUTO: 3.89 X10*6/UL (ref 4–5.2)
REFLEX ADDED, ANEMIA PANEL: NORMAL
WBC # BLD AUTO: 4.7 X10*3/UL (ref 4.4–11.3)

## 2025-06-18 PROCEDURE — 99214 OFFICE O/P EST MOD 30 MIN: CPT | Performed by: OBSTETRICS & GYNECOLOGY

## 2025-06-18 PROCEDURE — 85027 COMPLETE CBC AUTOMATED: CPT

## 2025-06-18 NOTE — PROGRESS NOTES
A/P:     -  Recheck platelets today     -  D/W the patient sx of PEC     -  Continue BP checks 2x/day     -  39 week IOL - next week

## 2025-06-18 NOTE — TELEPHONE ENCOUNTER
Pt verified by name and .  Pt is aware her IOL is scheduled for 2025 at 2:00 PM at [facility].  Pt has no questions at this time.  Nurse sent message to Dr. Christine to place orders.

## 2025-06-20 ENCOUNTER — PREP FOR PROCEDURE (OUTPATIENT)
Dept: OBSTETRICS AND GYNECOLOGY | Facility: CLINIC | Age: 35
End: 2025-06-20
Payer: COMMERCIAL

## 2025-06-25 ENCOUNTER — APPOINTMENT (OUTPATIENT)
Dept: OBSTETRICS AND GYNECOLOGY | Facility: CLINIC | Age: 35
End: 2025-06-25
Payer: COMMERCIAL

## 2025-06-25 VITALS — BODY MASS INDEX: 28.91 KG/M2 | DIASTOLIC BLOOD PRESSURE: 58 MMHG | SYSTOLIC BLOOD PRESSURE: 104 MMHG | WEIGHT: 184.6 LBS

## 2025-06-25 DIAGNOSIS — F41.9 ANXIETY: ICD-10-CM

## 2025-06-25 DIAGNOSIS — Z87.59 HISTORY OF GESTATIONAL HYPERTENSION: ICD-10-CM

## 2025-06-25 DIAGNOSIS — Z98.891 HISTORY OF VBAC: ICD-10-CM

## 2025-06-25 DIAGNOSIS — O09.523 HIGH RISK PREGNANCY, MULTIGRAVIDA OF ADVANCED MATERNAL AGE IN THIRD TRIMESTER (HHS-HCC): Primary | ICD-10-CM

## 2025-06-25 DIAGNOSIS — Z87.59 HISTORY OF POSTPARTUM HEMORRHAGE: ICD-10-CM

## 2025-06-25 DIAGNOSIS — N89.8 ITCHING IN THE VAGINAL AREA: ICD-10-CM

## 2025-06-25 PROCEDURE — 99214 OFFICE O/P EST MOD 30 MIN: CPT | Performed by: OBSTETRICS & GYNECOLOGY

## 2025-06-25 RX ORDER — ASPIRIN 81 MG/1
81 TABLET ORAL DAILY
COMMUNITY
End: 2025-06-29 | Stop reason: HOSPADM

## 2025-06-25 NOTE — PROGRESS NOTES
She reports great fetal movement.  She is without symptoms of active labor or preeclampsia.  She is ready for her induction tomorrow.    She reports vulvovaginal itching/irritation and is concerned about a possible yeast infection after being at the beach/in [facility].    A/P: HROB - AMA, prior , anxiety and depression     -  BV swab sent     -  Fetal movement     -  D/w the patient when to call     -  Will start Zoloft PP     -  Lactation follow up I the ospital to discuss pumping PP in anticipation of RTW at 6 weeks     -  RTC 1 week for PPV/to follow up mood and BP, then 6 weeks for routine PPV

## 2025-06-26 ENCOUNTER — ANESTHESIA EVENT (OUTPATIENT)
Dept: OBSTETRICS AND GYNECOLOGY | Facility: HOSPITAL | Age: 35
End: 2025-06-26
Payer: COMMERCIAL

## 2025-06-26 ENCOUNTER — HOSPITAL ENCOUNTER (INPATIENT)
Facility: HOSPITAL | Age: 35
LOS: 3 days | Discharge: HOME | End: 2025-06-29
Attending: STUDENT IN AN ORGANIZED HEALTH CARE EDUCATION/TRAINING PROGRAM | Admitting: STUDENT IN AN ORGANIZED HEALTH CARE EDUCATION/TRAINING PROGRAM
Payer: COMMERCIAL

## 2025-06-26 ENCOUNTER — APPOINTMENT (OUTPATIENT)
Dept: OBSTETRICS AND GYNECOLOGY | Facility: HOSPITAL | Age: 35
End: 2025-06-26
Payer: COMMERCIAL

## 2025-06-26 ENCOUNTER — ANESTHESIA (OUTPATIENT)
Dept: OBSTETRICS AND GYNECOLOGY | Facility: HOSPITAL | Age: 35
End: 2025-06-26
Payer: COMMERCIAL

## 2025-06-26 DIAGNOSIS — Z34.90 ENCOUNTER FOR INDUCTION OF LABOR: ICD-10-CM

## 2025-06-26 LAB
ABO GROUP (TYPE) IN BLOOD: NORMAL
ANTIBODY SCREEN: NORMAL
BV SCORE VAG QL: ABNORMAL
ERYTHROCYTE [DISTWIDTH] IN BLOOD BY AUTOMATED COUNT: 13.9 % (ref 11.5–14.5)
HCT VFR BLD AUTO: 34.3 % (ref 36–46)
HGB BLD-MCNC: 10.8 G/DL (ref 12–16)
MCH RBC QN AUTO: 28.7 PG (ref 26–34)
MCHC RBC AUTO-ENTMCNC: 31.5 G/DL (ref 32–36)
MCV RBC AUTO: 91 FL (ref 80–100)
NRBC BLD-RTO: 0 /100 WBCS (ref 0–0)
PLATELET # BLD AUTO: 152 X10*3/UL (ref 150–450)
RBC # BLD AUTO: 3.76 X10*6/UL (ref 4–5.2)
RH FACTOR (ANTIGEN D): NORMAL
TREPONEMA PALLIDUM IGG+IGM AB [PRESENCE] IN SERUM OR PLASMA BY IMMUNOASSAY: NONREACTIVE
WBC # BLD AUTO: 4.3 X10*3/UL (ref 4.4–11.3)

## 2025-06-26 PROCEDURE — 1120000001 HC OB PRIVATE ROOM DAILY

## 2025-06-26 PROCEDURE — 3E033VJ INTRODUCTION OF OTHER HORMONE INTO PERIPHERAL VEIN, PERCUTANEOUS APPROACH: ICD-10-PCS | Performed by: STUDENT IN AN ORGANIZED HEALTH CARE EDUCATION/TRAINING PROGRAM

## 2025-06-26 PROCEDURE — 36415 COLL VENOUS BLD VENIPUNCTURE: CPT

## 2025-06-26 PROCEDURE — 7210000002 HC LABOR PER HOUR

## 2025-06-26 PROCEDURE — 0U7C7DJ DILATION OF CERVIX WITH INTRALUM DEV, TEMP, VIA OPENING: ICD-10-PCS | Performed by: STUDENT IN AN ORGANIZED HEALTH CARE EDUCATION/TRAINING PROGRAM

## 2025-06-26 PROCEDURE — 85027 COMPLETE CBC AUTOMATED: CPT

## 2025-06-26 PROCEDURE — 2500000004 HC RX 250 GENERAL PHARMACY W/ HCPCS (ALT 636 FOR OP/ED): Mod: SE

## 2025-06-26 PROCEDURE — 86923 COMPATIBILITY TEST ELECTRIC: CPT

## 2025-06-26 PROCEDURE — 86901 BLOOD TYPING SEROLOGIC RH(D): CPT

## 2025-06-26 PROCEDURE — 86780 TREPONEMA PALLIDUM: CPT

## 2025-06-26 PROCEDURE — 2500000004 HC RX 250 GENERAL PHARMACY W/ HCPCS (ALT 636 FOR OP/ED): Mod: JZ,SE

## 2025-06-26 PROCEDURE — 59050 FETAL MONITOR W/REPORT: CPT

## 2025-06-26 RX ORDER — LIDOCAINE HYDROCHLORIDE 10 MG/ML
20 INJECTION, SOLUTION INFILTRATION; PERINEURAL ONCE AS NEEDED
Status: DISCONTINUED | OUTPATIENT
Start: 2025-06-26 | End: 2025-06-27

## 2025-06-26 RX ORDER — OXYTOCIN/0.9 % SODIUM CHLORIDE 30/500 ML
2-30 PLASTIC BAG, INJECTION (ML) INTRAVENOUS CONTINUOUS
Status: DISCONTINUED | OUTPATIENT
Start: 2025-06-26 | End: 2025-06-27

## 2025-06-26 RX ORDER — CARBOPROST TROMETHAMINE 250 UG/ML
250 INJECTION, SOLUTION INTRAMUSCULAR ONCE AS NEEDED
Status: DISCONTINUED | OUTPATIENT
Start: 2025-06-26 | End: 2025-06-27

## 2025-06-26 RX ORDER — MISOPROSTOL 200 UG/1
800 TABLET ORAL ONCE AS NEEDED
Status: COMPLETED | OUTPATIENT
Start: 2025-06-26 | End: 2025-06-27

## 2025-06-26 RX ORDER — ONDANSETRON HYDROCHLORIDE 2 MG/ML
4 INJECTION, SOLUTION INTRAVENOUS EVERY 6 HOURS PRN
Status: DISCONTINUED | OUTPATIENT
Start: 2025-06-26 | End: 2025-06-27

## 2025-06-26 RX ORDER — LABETALOL HYDROCHLORIDE 5 MG/ML
20 INJECTION, SOLUTION INTRAVENOUS ONCE AS NEEDED
Status: DISCONTINUED | OUTPATIENT
Start: 2025-06-26 | End: 2025-06-27

## 2025-06-26 RX ORDER — TRANEXAMIC ACID 1 G/10ML
1000 INJECTION, SOLUTION INTRAVENOUS ONCE AS NEEDED
Status: DISCONTINUED | OUTPATIENT
Start: 2025-06-26 | End: 2025-06-27

## 2025-06-26 RX ORDER — PENICILLIN G 3000000 [IU]/50ML
3 INJECTION, SOLUTION INTRAVENOUS EVERY 4 HOURS
Status: DISCONTINUED | OUTPATIENT
Start: 2025-06-26 | End: 2025-06-27

## 2025-06-26 RX ORDER — ONDANSETRON 4 MG/1
4 TABLET, FILM COATED ORAL EVERY 6 HOURS PRN
Status: DISCONTINUED | OUTPATIENT
Start: 2025-06-26 | End: 2025-06-27

## 2025-06-26 RX ORDER — OXYTOCIN/0.9 % SODIUM CHLORIDE 30/500 ML
60 PLASTIC BAG, INJECTION (ML) INTRAVENOUS ONCE AS NEEDED
Status: DISCONTINUED | OUTPATIENT
Start: 2025-06-26 | End: 2025-06-27

## 2025-06-26 RX ORDER — SODIUM CHLORIDE, SODIUM LACTATE, POTASSIUM CHLORIDE, CALCIUM CHLORIDE 600; 310; 30; 20 MG/100ML; MG/100ML; MG/100ML; MG/100ML
75 INJECTION, SOLUTION INTRAVENOUS CONTINUOUS
Status: DISCONTINUED | OUTPATIENT
Start: 2025-06-26 | End: 2025-06-27

## 2025-06-26 RX ORDER — OXYTOCIN 10 [USP'U]/ML
10 INJECTION, SOLUTION INTRAMUSCULAR; INTRAVENOUS ONCE AS NEEDED
Status: DISCONTINUED | OUTPATIENT
Start: 2025-06-26 | End: 2025-06-27

## 2025-06-26 RX ORDER — CALCIUM CARBONATE 200(500)MG
1 TABLET,CHEWABLE ORAL EVERY 6 HOURS PRN
Status: DISCONTINUED | OUTPATIENT
Start: 2025-06-26 | End: 2025-06-27

## 2025-06-26 RX ORDER — METHYLERGONOVINE MALEATE 0.2 MG/ML
0.2 INJECTION INTRAVENOUS ONCE AS NEEDED
Status: DISCONTINUED | OUTPATIENT
Start: 2025-06-26 | End: 2025-06-27

## 2025-06-26 RX ORDER — TERBUTALINE SULFATE 1 MG/ML
0.25 INJECTION SUBCUTANEOUS ONCE AS NEEDED
Status: DISCONTINUED | OUTPATIENT
Start: 2025-06-26 | End: 2025-06-27

## 2025-06-26 RX ORDER — HYDRALAZINE HYDROCHLORIDE 20 MG/ML
5 INJECTION INTRAMUSCULAR; INTRAVENOUS ONCE AS NEEDED
Status: DISCONTINUED | OUTPATIENT
Start: 2025-06-26 | End: 2025-06-27

## 2025-06-26 RX ORDER — FENTANYL/ROPIVACAINE/NS/PF 2MCG/ML-.2
0-25 PLASTIC BAG, INJECTION (ML) INJECTION CONTINUOUS
Refills: 0 | Status: DISCONTINUED | OUTPATIENT
Start: 2025-06-26 | End: 2025-06-27

## 2025-06-26 RX ORDER — LOPERAMIDE HYDROCHLORIDE 2 MG/1
4 CAPSULE ORAL EVERY 2 HOUR PRN
Status: DISCONTINUED | OUTPATIENT
Start: 2025-06-26 | End: 2025-06-27

## 2025-06-26 RX ADMIN — PENICILLIN G 3 MILLION UNITS: 3000000 INJECTION, SOLUTION INTRAVENOUS at 21:35

## 2025-06-26 RX ADMIN — Medication 2 MILLI-UNITS/MIN: at 17:48

## 2025-06-26 RX ADMIN — SODIUM CHLORIDE, SODIUM LACTATE, POTASSIUM CHLORIDE, AND CALCIUM CHLORIDE 75 ML/HR: .6; .31; .03; .02 INJECTION, SOLUTION INTRAVENOUS at 17:47

## 2025-06-26 RX ADMIN — PENICILLIN G POTASSIUM 5 MILLION UNITS: 5000000 INJECTION, POWDER, FOR SOLUTION INTRAMUSCULAR; INTRAVENOUS at 17:48

## 2025-06-26 SDOH — SOCIAL STABILITY: SOCIAL INSECURITY: WITHIN THE LAST YEAR, HAVE YOU BEEN AFRAID OF YOUR PARTNER OR EX-PARTNER?: NO

## 2025-06-26 SDOH — ECONOMIC STABILITY: FOOD INSECURITY: WITHIN THE PAST 12 MONTHS, THE FOOD YOU BOUGHT JUST DIDN'T LAST AND YOU DIDN'T HAVE MONEY TO GET MORE.: NEVER TRUE

## 2025-06-26 SDOH — SOCIAL STABILITY: SOCIAL INSECURITY
WITHIN THE LAST YEAR, HAVE YOU BEEN KICKED, HIT, SLAPPED, OR OTHERWISE PHYSICALLY HURT BY YOUR PARTNER OR EX-PARTNER?: NO

## 2025-06-26 SDOH — HEALTH STABILITY: MENTAL HEALTH: WERE YOU ABLE TO COMPLETE ALL THE BEHAVIORAL HEALTH SCREENINGS?: YES

## 2025-06-26 SDOH — SOCIAL STABILITY: SOCIAL INSECURITY: WITHIN THE LAST YEAR, HAVE YOU BEEN HUMILIATED OR EMOTIONALLY ABUSED IN OTHER WAYS BY YOUR PARTNER OR EX-PARTNER?: NO

## 2025-06-26 SDOH — HEALTH STABILITY: MENTAL HEALTH: NON-SPECIFIC ACTIVE SUICIDAL THOUGHTS (PAST 1 MONTH): NO

## 2025-06-26 SDOH — SOCIAL STABILITY: SOCIAL INSECURITY: DOES ANYONE TRY TO KEEP YOU FROM HAVING/CONTACTING OTHER FRIENDS OR DOING THINGS OUTSIDE YOUR HOME?: NO

## 2025-06-26 SDOH — ECONOMIC STABILITY: FOOD INSECURITY: WITHIN THE PAST 12 MONTHS, YOU WORRIED THAT YOUR FOOD WOULD RUN OUT BEFORE YOU GOT THE MONEY TO BUY MORE.: NEVER TRUE

## 2025-06-26 SDOH — SOCIAL STABILITY: SOCIAL INSECURITY: ABUSE SCREEN: ADULT

## 2025-06-26 SDOH — SOCIAL STABILITY: SOCIAL INSECURITY: ARE THERE ANY APPARENT SIGNS OF INJURIES/BEHAVIORS THAT COULD BE RELATED TO ABUSE/NEGLECT?: NO

## 2025-06-26 SDOH — HEALTH STABILITY: MENTAL HEALTH: SUICIDAL BEHAVIOR (LIFETIME): NO

## 2025-06-26 SDOH — SOCIAL STABILITY: SOCIAL INSECURITY: HAS ANYONE EVER THREATENED TO HURT YOUR FAMILY OR YOUR PETS?: NO

## 2025-06-26 SDOH — HEALTH STABILITY: MENTAL HEALTH: WISH TO BE DEAD (PAST 1 MONTH): NO

## 2025-06-26 SDOH — SOCIAL STABILITY: SOCIAL INSECURITY
WITHIN THE LAST YEAR, HAVE YOU BEEN RAPED OR FORCED TO HAVE ANY KIND OF SEXUAL ACTIVITY BY YOUR PARTNER OR EX-PARTNER?: NO

## 2025-06-26 SDOH — SOCIAL STABILITY: SOCIAL INSECURITY: PHYSICAL ABUSE: DENIES

## 2025-06-26 SDOH — ECONOMIC STABILITY: HOUSING INSECURITY: DO YOU FEEL UNSAFE GOING BACK TO THE PLACE WHERE YOU ARE LIVING?: NO

## 2025-06-26 SDOH — SOCIAL STABILITY: SOCIAL INSECURITY: HAVE YOU HAD THOUGHTS OF HARMING ANYONE ELSE?: NO

## 2025-06-26 SDOH — SOCIAL STABILITY: SOCIAL INSECURITY: HAVE YOU HAD ANY THOUGHTS OF HARMING ANYONE ELSE?: NO

## 2025-06-26 SDOH — SOCIAL STABILITY: SOCIAL INSECURITY: DO YOU FEEL ANYONE HAS EXPLOITED OR TAKEN ADVANTAGE OF YOU FINANCIALLY OR OF YOUR PERSONAL PROPERTY?: NO

## 2025-06-26 SDOH — SOCIAL STABILITY: SOCIAL INSECURITY: ARE YOU OR HAVE YOU BEEN THREATENED OR ABUSED PHYSICALLY, EMOTIONALLY, OR SEXUALLY BY ANYONE?: NO

## 2025-06-26 SDOH — SOCIAL STABILITY: SOCIAL INSECURITY: VERBAL ABUSE: DENIES

## 2025-06-26 ASSESSMENT — PAIN SCALES - GENERAL
PAINLEVEL_OUTOF10: 3
PAINLEVEL_OUTOF10: 0 - NO PAIN
PAINLEVEL_OUTOF10: 0 - NO PAIN
PAINLEVEL_OUTOF10: 3

## 2025-06-26 ASSESSMENT — LIFESTYLE VARIABLES
AUDIT-C TOTAL SCORE: 0
SKIP TO QUESTIONS 9-10: 1
HOW OFTEN DO YOU HAVE 6 OR MORE DRINKS ON ONE OCCASION: NEVER
HOW OFTEN DO YOU HAVE A DRINK CONTAINING ALCOHOL: NEVER
HOW MANY STANDARD DRINKS CONTAINING ALCOHOL DO YOU HAVE ON A TYPICAL DAY: PATIENT DOES NOT DRINK
AUDIT-C TOTAL SCORE: 0

## 2025-06-26 ASSESSMENT — ACTIVITIES OF DAILY LIVING (ADL): LACK_OF_TRANSPORTATION: NO

## 2025-06-26 ASSESSMENT — PAIN SCALES - WONG BAKER: WONGBAKER_NUMERICALRESPONSE: HURTS LITTLE BIT

## 2025-06-26 ASSESSMENT — PATIENT HEALTH QUESTIONNAIRE - PHQ9
2. FEELING DOWN, DEPRESSED OR HOPELESS: NOT AT ALL
SUM OF ALL RESPONSES TO PHQ9 QUESTIONS 1 & 2: 0
1. LITTLE INTEREST OR PLEASURE IN DOING THINGS: NOT AT ALL

## 2025-06-26 ASSESSMENT — PAIN DESCRIPTION - DESCRIPTORS: DESCRIPTORS: DULL;HEADACHE

## 2025-06-26 NOTE — H&P
Obstetrical Admission History and Physical    Assessment/Plan    Jd Witt is a 34 y.o.  at 39w0d by LMP c/w 6wk US presenting for rr TOLAC IOL     IOL  - Admitted, consented, scanned - cephalic   - Cervix unfavorable, for induction with CRB and pit   - T&S, CBC on admission,  T&C x1u in setting of TOLAC  - Epidural available as requested  - Delivery plan: pt desires vaginal delivery, patient counseled on risks of labor, vaginal delivery, and possibility of C/S for varying maternal or fetal indications. Reviewed risks of TOLAC including the risk of uterine rupture of ~0.5-0.9% and a 10-15% risk of adverse maternal or  outcome in the context of rupture.   - PPBC: considering interval nexplanon     Maternal comorbidities:   - anxiety and depression - for zoloft postpartum   - hx PPH - T&C as above     Fetal Wellbeing   - CEFM, currently Cat 1  - GBS positive, for PCN   - EFW 7lb by leopolds     Patient seen and discussed with Dr. Beena Aguilar MD  PGY-1, Obstetrics and gynecology     Subjective   5yo  at 39w0d by LMP c/w 6wk US (24) presents for rrIOL     Pregnancy notable for:   - Anemia: hgb 10.6 ()  - AMA - declined cfDNA   - hx CS x1 - P1 for twins followed by   - Ho PPH - in s/o CS and twins, requiring blood transfusion and B-gallagher, admission Hgb 10.5  - Anxiety and depression - will start Zoloft PP     Obstetrical History   OB History    Para Term  AB Living   3 2 2   3   SAB IAB Ectopic Multiple Live Births      1 3      # Outcome Date GA Lbr Fernie/2nd Weight Sex Type Anes PTL Lv   3 Current            2 Term 20     Vag-Spont   GRACIELA   1A Term 17    M CS-LTranv   GRACIELA   1B Term 17    M CS-LVertical   GRACIELA       Past Medical History  Medical History[1]     Past Surgical History   Surgical History[2]    Social History  Social History     Tobacco Use    Smoking status: Never    Smokeless tobacco: Never   Substance Use Topics     Alcohol use: Not Currently     Substance and Sexual Activity   Drug Use Never       Allergies  Patient has no known allergies.     Medications  Prescriptions Prior to Admission[3]    Objective    Last Vitals  Temp Pulse Resp BP MAP O2 Sat                   Physical Examination  General: no acute distress  HEENT: normocephalic, atraumatic  Heart: warm and well perfused  Lungs: no increased WOB  Abdomen: gravid  Extremities: moving all extremities  Neuro: awake and conversant  Psych: appropriate mood and affect    SVE: /-3  FHT: 135/mod/+accel/no decels  [place]: irregular  BSUS: cephalic    Patient was placed in dorsal lithotomy position, a speculum was placed, and a cervical ripening balloon was guided through the external and internal cervical os with a ring forceps. The balloon was inflated with 60cc of normal saline. Pt tolerated the procedure well.      Lab Review  Labs in chart were reviewed.  0   Lab Value Date/Time    GRPBSTREP SEE NOTE (A) 2025 1336     CBC   Recent Labs     25  1516   WBC 4.3*   HGB 10.8*   HCT 34.3*        CMP             [1]   Past Medical History:  Diagnosis Date    Anemia     Anxiety     Hypertension with pregnancy    Migraine     Sleep difficulties     Urogenital trichomoniasis    [2]   Past Surgical History:  Procedure Laterality Date     SECTION, LOW TRANSVERSE  2017     Section Low Transverse    DILATION AND CURETTAGE OF UTERUS      UMBILICAL HERNIA REPAIR     [3]   No medications prior to admission.

## 2025-06-26 NOTE — ANESTHESIA PREPROCEDURE EVALUATION
Patient: Jd Witt    Evaluation Method: In-person visit    Procedure Information    Date: 25  Procedure: Labor Consult         Relevant Problems   Anesthesia (within normal limits)      Cardiac (within normal limits)      Pulmonary (within normal limits)      Neuro   (+) Anxiety      GI (within normal limits)      /Renal   (+) Urinary tract infection in mother during second trimester of pregnancy ([facility])      Liver (within normal limits)      Endocrine (within normal limits)      Hematology (within normal limits)      Musculoskeletal (within normal limits)      HEENT (within normal limits)      ID   (+) Urinary tract infection in mother during second trimester of pregnancy ([facility])      Skin (within normal limits)      GYN   (+) 37 weeks gestation of pregnancy ([facility])   (+) High risk pregnancy, multigravida of advanced maternal age in third trimester ([facility])      Gravid and    (+) Encounter for induction of labor       Clinical information reviewed:    Allergies  Meds               NPO Detail:  No data recorded     OB/Gyn Evaluation    Present Pregnancy    Patient is pregnant now.  (+) , trial of labor after    Obstetric History                Physical Exam    Airway  Mallampati: II  TM distance: >3 FB  Neck ROM: full     Cardiovascular    Dental - normal exam     Pulmonary    Abdominal            Anesthesia Plan    History of general anesthesia?: yes  History of complications of general anesthesia?: no    ASA 3   (Discussed r/b epidural, spinal, GETA)  Anesthetic plan and risks discussed with patient.    Plan discussed with resident and attending.

## 2025-06-27 ENCOUNTER — ANESTHESIA (OUTPATIENT)
Dept: OBSTETRICS AND GYNECOLOGY | Facility: HOSPITAL | Age: 35
End: 2025-06-27
Payer: COMMERCIAL

## 2025-06-27 ENCOUNTER — ANESTHESIA EVENT (OUTPATIENT)
Dept: OBSTETRICS AND GYNECOLOGY | Facility: HOSPITAL | Age: 35
End: 2025-06-27
Payer: COMMERCIAL

## 2025-06-27 ENCOUNTER — APPOINTMENT (OUTPATIENT)
Dept: OBSTETRICS AND GYNECOLOGY | Facility: CLINIC | Age: 35
End: 2025-06-27
Payer: COMMERCIAL

## 2025-06-27 PROCEDURE — 7100000016 HC LABOR RECOVERY PER HOUR

## 2025-06-27 PROCEDURE — 1100000001 HC PRIVATE ROOM DAILY

## 2025-06-27 PROCEDURE — 2500000004 HC RX 250 GENERAL PHARMACY W/ HCPCS (ALT 636 FOR OP/ED): Mod: JZ,SE | Performed by: ANESTHESIOLOGY

## 2025-06-27 PROCEDURE — 51701 INSERT BLADDER CATHETER: CPT

## 2025-06-27 PROCEDURE — 7210000002 HC LABOR PER HOUR

## 2025-06-27 PROCEDURE — 10907ZC DRAINAGE OF AMNIOTIC FLUID, THERAPEUTIC FROM PRODUCTS OF CONCEPTION, VIA NATURAL OR ARTIFICIAL OPENING: ICD-10-PCS | Performed by: OBSTETRICS & GYNECOLOGY

## 2025-06-27 PROCEDURE — 2500000002 HC RX 250 W HCPCS SELF ADMINISTERED DRUGS (ALT 637 FOR MEDICARE OP, ALT 636 FOR OP/ED): Mod: SE

## 2025-06-27 PROCEDURE — 3E0P7VZ INTRODUCTION OF HORMONE INTO FEMALE REPRODUCTIVE, VIA NATURAL OR ARTIFICIAL OPENING: ICD-10-PCS | Performed by: OBSTETRICS & GYNECOLOGY

## 2025-06-27 PROCEDURE — 01967 NEURAXL LBR ANES VAG DLVR: CPT | Performed by: STUDENT IN AN ORGANIZED HEALTH CARE EDUCATION/TRAINING PROGRAM

## 2025-06-27 PROCEDURE — 2500000004 HC RX 250 GENERAL PHARMACY W/ HCPCS (ALT 636 FOR OP/ED): Mod: SE

## 2025-06-27 PROCEDURE — 2500000004 HC RX 250 GENERAL PHARMACY W/ HCPCS (ALT 636 FOR OP/ED): Mod: JZ,SE

## 2025-06-27 PROCEDURE — 3700000014 EPIDURAL BLOCK: Performed by: ANESTHESIOLOGIST ASSISTANT

## 2025-06-27 PROCEDURE — 2500000001 HC RX 250 WO HCPCS SELF ADMINISTERED DRUGS (ALT 637 FOR MEDICARE OP): Mod: SE

## 2025-06-27 PROCEDURE — 59409 OBSTETRICAL CARE: CPT | Performed by: OBSTETRICS & GYNECOLOGY

## 2025-06-27 RX ORDER — TRANEXAMIC ACID 1 G/10ML
1000 INJECTION, SOLUTION INTRAVENOUS ONCE AS NEEDED
Status: DISCONTINUED | OUTPATIENT
Start: 2025-06-27 | End: 2025-06-29 | Stop reason: HOSPADM

## 2025-06-27 RX ORDER — OXYTOCIN/0.9 % SODIUM CHLORIDE 30/500 ML
60 PLASTIC BAG, INJECTION (ML) INTRAVENOUS ONCE AS NEEDED
Status: COMPLETED | OUTPATIENT
Start: 2025-06-27 | End: 2025-06-27

## 2025-06-27 RX ORDER — ONDANSETRON HYDROCHLORIDE 2 MG/ML
4 INJECTION, SOLUTION INTRAVENOUS EVERY 6 HOURS PRN
Status: DISCONTINUED | OUTPATIENT
Start: 2025-06-27 | End: 2025-06-29 | Stop reason: HOSPADM

## 2025-06-27 RX ORDER — CARBOPROST TROMETHAMINE 250 UG/ML
250 INJECTION, SOLUTION INTRAMUSCULAR ONCE AS NEEDED
Status: DISCONTINUED | OUTPATIENT
Start: 2025-06-27 | End: 2025-06-29 | Stop reason: HOSPADM

## 2025-06-27 RX ORDER — METHYLERGONOVINE MALEATE 0.2 MG/ML
0.2 INJECTION INTRAVENOUS ONCE AS NEEDED
Status: DISCONTINUED | OUTPATIENT
Start: 2025-06-27 | End: 2025-06-29 | Stop reason: HOSPADM

## 2025-06-27 RX ORDER — ADHESIVE BANDAGE
10 BANDAGE TOPICAL
Status: DISCONTINUED | OUTPATIENT
Start: 2025-06-27 | End: 2025-06-29 | Stop reason: HOSPADM

## 2025-06-27 RX ORDER — POLYETHYLENE GLYCOL 3350 17 G/17G
17 POWDER, FOR SOLUTION ORAL 2 TIMES DAILY PRN
Status: DISCONTINUED | OUTPATIENT
Start: 2025-06-27 | End: 2025-06-29 | Stop reason: HOSPADM

## 2025-06-27 RX ORDER — OXYTOCIN 10 [USP'U]/ML
10 INJECTION, SOLUTION INTRAMUSCULAR; INTRAVENOUS ONCE AS NEEDED
Status: DISCONTINUED | OUTPATIENT
Start: 2025-06-27 | End: 2025-06-29 | Stop reason: HOSPADM

## 2025-06-27 RX ORDER — DIPHENHYDRAMINE HYDROCHLORIDE 50 MG/ML
25 INJECTION, SOLUTION INTRAMUSCULAR; INTRAVENOUS EVERY 6 HOURS PRN
Status: DISCONTINUED | OUTPATIENT
Start: 2025-06-27 | End: 2025-06-29 | Stop reason: HOSPADM

## 2025-06-27 RX ORDER — HYDRALAZINE HYDROCHLORIDE 20 MG/ML
5 INJECTION INTRAMUSCULAR; INTRAVENOUS ONCE AS NEEDED
Status: DISCONTINUED | OUTPATIENT
Start: 2025-06-27 | End: 2025-06-29 | Stop reason: HOSPADM

## 2025-06-27 RX ORDER — LOPERAMIDE HYDROCHLORIDE 2 MG/1
4 CAPSULE ORAL EVERY 2 HOUR PRN
Status: DISCONTINUED | OUTPATIENT
Start: 2025-06-27 | End: 2025-06-29 | Stop reason: HOSPADM

## 2025-06-27 RX ORDER — DIPHENHYDRAMINE HCL 25 MG
25 CAPSULE ORAL EVERY 6 HOURS PRN
Status: DISCONTINUED | OUTPATIENT
Start: 2025-06-27 | End: 2025-06-29 | Stop reason: HOSPADM

## 2025-06-27 RX ORDER — IBUPROFEN 600 MG/1
600 TABLET, FILM COATED ORAL EVERY 6 HOURS
Status: DISCONTINUED | OUTPATIENT
Start: 2025-06-27 | End: 2025-06-29 | Stop reason: HOSPADM

## 2025-06-27 RX ORDER — FENTANYL/ROPIVACAINE/NS/PF 2MCG/ML-.2
0-25 PLASTIC BAG, INJECTION (ML) INJECTION CONTINUOUS
Refills: 0 | Status: DISCONTINUED | OUTPATIENT
Start: 2025-06-27 | End: 2025-06-27

## 2025-06-27 RX ORDER — MISOPROSTOL 200 UG/1
800 TABLET ORAL ONCE AS NEEDED
Status: DISCONTINUED | OUTPATIENT
Start: 2025-06-27 | End: 2025-06-29 | Stop reason: HOSPADM

## 2025-06-27 RX ORDER — LABETALOL HYDROCHLORIDE 5 MG/ML
20 INJECTION, SOLUTION INTRAVENOUS ONCE AS NEEDED
Status: DISCONTINUED | OUTPATIENT
Start: 2025-06-27 | End: 2025-06-29 | Stop reason: HOSPADM

## 2025-06-27 RX ORDER — DOCUSATE SODIUM 100 MG/1
100 CAPSULE, LIQUID FILLED ORAL 2 TIMES DAILY
Status: DISCONTINUED | OUTPATIENT
Start: 2025-06-27 | End: 2025-06-27

## 2025-06-27 RX ORDER — SIMETHICONE 80 MG
80 TABLET,CHEWABLE ORAL 4 TIMES DAILY PRN
Status: DISCONTINUED | OUTPATIENT
Start: 2025-06-27 | End: 2025-06-29 | Stop reason: HOSPADM

## 2025-06-27 RX ORDER — ONDANSETRON 4 MG/1
4 TABLET, FILM COATED ORAL EVERY 6 HOURS PRN
Status: DISCONTINUED | OUTPATIENT
Start: 2025-06-27 | End: 2025-06-29 | Stop reason: HOSPADM

## 2025-06-27 RX ORDER — ACETAMINOPHEN 325 MG/1
975 TABLET ORAL EVERY 6 HOURS
Status: DISCONTINUED | OUTPATIENT
Start: 2025-06-27 | End: 2025-06-29 | Stop reason: HOSPADM

## 2025-06-27 RX ADMIN — PENICILLIN G 3 MILLION UNITS: 3000000 INJECTION, SOLUTION INTRAVENOUS at 14:21

## 2025-06-27 RX ADMIN — Medication 60 MILLI-UNITS/MIN: at 20:40

## 2025-06-27 RX ADMIN — PENICILLIN G 3 MILLION UNITS: 3000000 INJECTION, SOLUTION INTRAVENOUS at 06:23

## 2025-06-27 RX ADMIN — PENICILLIN G 3 MILLION UNITS: 3000000 INJECTION, SOLUTION INTRAVENOUS at 18:19

## 2025-06-27 RX ADMIN — IBUPROFEN 600 MG: 600 TABLET ORAL at 22:15

## 2025-06-27 RX ADMIN — Medication 10 ML/HR: at 13:13

## 2025-06-27 RX ADMIN — SODIUM CHLORIDE, SODIUM LACTATE, POTASSIUM CHLORIDE, AND CALCIUM CHLORIDE 500 ML: .6; .31; .03; .02 INJECTION, SOLUTION INTRAVENOUS at 12:35

## 2025-06-27 RX ADMIN — PENICILLIN G 3 MILLION UNITS: 3000000 INJECTION, SOLUTION INTRAVENOUS at 01:45

## 2025-06-27 RX ADMIN — Medication 5 ML: at 13:12

## 2025-06-27 RX ADMIN — SODIUM CHLORIDE, SODIUM LACTATE, POTASSIUM CHLORIDE, AND CALCIUM CHLORIDE 75 ML/HR: .6; .31; .03; .02 INJECTION, SOLUTION INTRAVENOUS at 13:06

## 2025-06-27 RX ADMIN — PENICILLIN G 3 MILLION UNITS: 3000000 INJECTION, SOLUTION INTRAVENOUS at 10:20

## 2025-06-27 RX ADMIN — MISOPROSTOL 800 MCG: 200 TABLET ORAL at 20:18

## 2025-06-27 ASSESSMENT — PAIN SCALES - GENERAL
PAINLEVEL_OUTOF10: 0 - NO PAIN
PAINLEVEL_OUTOF10: 10 - WORST POSSIBLE PAIN
PAINLEVEL_OUTOF10: 3
PAINLEVEL_OUTOF10: 5 - MODERATE PAIN
PAINLEVEL_OUTOF10: 0 - NO PAIN
PAINLEVEL_OUTOF10: 0 - NO PAIN
PAINLEVEL_OUTOF10: 3
PAINLEVEL_OUTOF10: 0 - NO PAIN
PAINLEVEL_OUTOF10: 10 - WORST POSSIBLE PAIN
PAINLEVEL_OUTOF10: 7

## 2025-06-27 NOTE — ANESTHESIA PREPROCEDURE EVALUATION
Patient: Jd Witt    Evaluation Method: In-person visit    Procedure Information    Date: 25  Procedure: Labor Consult         Relevant Problems   Anesthesia (within normal limits)      Cardiac (within normal limits)      Pulmonary (within normal limits)      Neuro   (+) Anxiety      GI (within normal limits)      /Renal   (+) Urinary tract infection in mother during second trimester of pregnancy ([facility])      Liver (within normal limits)      Endocrine (within normal limits)      Hematology (within normal limits)      Musculoskeletal (within normal limits)      HEENT (within normal limits)      ID   (+) Urinary tract infection in mother during second trimester of pregnancy ([facility])      Skin (within normal limits)      GYN   (+) 37 weeks gestation of pregnancy ([facility])   (+) High risk pregnancy, multigravida of advanced maternal age in third trimester ([facility])       Clinical information reviewed:    Allergies  Meds               NPO Detail:  No data recorded     OB/Gyn Evaluation    Present Pregnancy    Patient is pregnant now.  (+) , trial of labor after    Obstetric History                Physical Exam    Airway  Mallampati: II  TM distance: >3 FB  Neck ROM: full     Cardiovascular    Dental - normal exam     Pulmonary    Abdominal            Anesthesia Plan    History of general anesthesia?: yes  History of complications of general anesthesia?: no    ASA 3   (Discussed r/b epidural, spinal, GETA)  Anesthetic plan and risks discussed with patient.    Plan discussed with resident and attending.

## 2025-06-27 NOTE — SIGNIFICANT EVENT
Labor check    S: Patient resting comfortably. She is feeling her contractions but is able to breathe through them. She reports some leakage of fluid when she is bouncing on the ball.     SVE: deferred  FHT: 135/mod/+accel/-decel  [place]: Q2-3m    A/P: Jd Witt is a 34 y.o.  at 39w0d. LIANET: 7/3/2025, by LMP c/w 6wk US presenting for rr TOLAC IOL.     IOL  - latent labor  - Pitocin currently at 6, uptitrate per protocol  - GBS positive, PCN infusing  - patient does not desire pain medications for her delivery  - she would like to attempt to SROM  - discussed rechecking cervix per patient request,, will check in with patient in 2-3 hours    Fetal Wellbeing  - CEFM, Cat I currently     Patient discussed with Dr. Koo and Dr. Engel.     Darlyn Ibarra, DO  PGY-1, Obstetric and Gynecology

## 2025-06-27 NOTE — SIGNIFICANT EVENT
Labor check    S: Was called to bedside by nursing. Patient was having a bowel movement on the toilet and was having a lot of pressure. We decided to do another cervical check.    SVE: 6/70/-2  FHT:   Baseline 135   Moderate variability   Accels present   2-3 Variable decelerations presents     [place]:   Q2-3 minutes    A/P: Jd is a 34 y.o.  at 39w1d undergoing rrIOL and TOLAC    IOL  - active labor  - Pitocin currently at 20, uptitrate per protocol  - GBS positive  - No epidural, plans for no epidural at this point  - Plan to reassess 3-4 hours or sooner if clinically indicated    Fetal Wellbeing  - CEFM, Cat 2 currently     Karoline Aguilar MD

## 2025-06-27 NOTE — ANESTHESIA PROCEDURE NOTES
Epidural Block    Patient location during procedure: OB  Start time: 6/27/2025 12:50 PM  End time: 6/27/2025 1:06 PM  Reason for block: labor analgesia  Staffing  Performed: FLORIDALMA   Authorized by: FLORIDALMA Mendez    Performed by: FLORIDALMA Mendez    Preanesthetic Checklist  Completed: patient identified, IV checked, site marked, risks and benefits discussed, surgical consent, monitors and equipment checked, pre-op evaluation, timeout performed and sterile techniques followed  Block Timeout  RN/Licensed healthcare professional reads aloud to the Anesthesia provider and entire team: Patient identity, procedure with side and site, patient position, and as applicable the availability of implants/special equipment/special requirements.  Patient on coagulant treatment: no  Timeout performed at: 6/27/2025 12:52 PM  Block Placement  Patient position: sitting  Prep: ChloraPrep  Sterility prep: cap, drape, gloves and mask  Sedation level: no sedation  Patient monitoring: blood pressure, continuous pulse oximetry and heart rate  Approach: midline  Local numbing: lidocaine 1% to skin and subcutaneous tissues  Vertebral space: lumbar  Lumbar location: L3-L4  Epidural  Loss of resistance technique: saline  Guidance: landmark technique        Needle  Needle type: Tuohy   Needle gauge: 17  Needle length: 8.9cm  Needle insertion depth: 4.5 cm  Catheter type: multi-orifice  Catheter size: 19 G  Catheter at skin depth: 9.5 cm  Catheter securement method: clear occlusive dressing and liquid medical adhesive    Test dose: lidocaine 1.5% with epinephrine 1-to-200,000  Test dose: lidocaine 1.5% with epinephrine 1-to-200,000  Test dose result: no positive test dose    PCEA  Medication concentration used: 0.2% Ropivacaine with 2 mcg/mL Fentanyl  Dose (mL): 5  Lockout (minutes): 30  1-Hour Limit (boluses/hr): 2  Basal Rate: 10        Assessment  Sensory level: T10 bilateral  Block outcome: patient comfortable  Number of attempts:  1  Events: no positive test dose  Procedure assessment: patient tolerated procedure well with no immediate complications

## 2025-06-27 NOTE — SIGNIFICANT EVENT
Labor check    S: Patient resting in bed. She is uncomfortable during her contractions but is managing the pain well. She is feeling increased pressure in her pelvis. She would like to be rechecked.     SVE: /-3, not active  FHT: 140/moderate/+accels/-decels  [place]: Q2-5m    A/P: Jd Witt is a 34 y.o.  at 39w0d. LIANET: 7/3/2025, by LMP c/w 6wk US presenting for rr TOLAC IOL.     IOL  - latent labor  - Pitocin currently at 16, uptitrate per protocol  - GBS positive, PCN  - patient is still managing well without pain medication  - she would still like to attempt to SROM.    Fetal Wellbeing  - CEFM, Cat I currently     Darlyn Ibarra, DO  PGY-1, Obstetrics and Gynecology

## 2025-06-27 NOTE — PROGRESS NOTES
Intrapartum Progress Note    Assessment/Plan   Jd Witt is a 34 y.o.  at 39w1d, LIANET: 7/3/2025, by Last Menstrual Period cw 6w US admitted for term TOLAC IOL.     TOLAC IOL  Method: pit  Pain management: epidural infusing  CEFM, currently Category I  GBS: pos on PCN   Next exam: Will recheck as clinically indicated by maternal or fetal status or per pt request   Previously checked by Dr Rolon with high fetal station, discussed with patient and still many hours before 18 hours of pitocin and AROM together     Maternal conditions  Anxiety and depression, plan to start zoloft postpartum     Seen and discussed with Dr. Ramirez Tesfaye MD PGY-2   OBGYN    Subjective   Jd Witt is a 34 y.o.  at 39w1d, LIANET: 7/3/2025, by Last Menstrual Period cw 6w US admitted for term TOLAC IOL.     Pt is lying comfortably in bed. Feeling a 5/10 HA and some nausea but declines any medications at this time. Partner, mom and  at bedside. Just had her epidural level turned down because she wanted to feel more. Otherwise doing well without concerns or complaints at this time.     Pregnancy notable for:   - Anemia: hgb 10.8 on admission  - AMA - declined cfDNA   - hx CS x1 - P1 for twins followed by   - Ho PPH - in s/o CS and twins, requiring blood transfusion and B-gallagher, admission Hgb 10.5  - Anxiety and depression - will start Zoloft PP    Objective   Last Vitals:  Temp Pulse Resp BP MAP Pulse Ox   36.4 °C (97.5 °F) 97 17 120/73   100 %     Vitals Min/Max Last 24 Hours:  Temp  Min: 36.1 °C (97 °F)  Max: 36.9 °C (98.4 °F)  Pulse  Min: 78  Max: 119  Resp  Min: 14  Max: 18  BP  Min: 95/63  Max: 128/69    Intake/Output:    Intake/Output Summary (Last 24 hours) at 2025 5477  Last data filed at 2025 1753  Gross per 24 hour   Intake --   Output 1200 ml   Net -1200 ml       Physical Examination:  General: no acute distress  HEENT: normocephalic, atraumatic  Heart: warm and well  perfused  Lungs: breathing comfortably on room air  Abdomen: gravid  Extremities: moving all extremities  Neuro: awake and conversant  Psych: appropriate mood and affect    FHT: 140/mod/+accel/-decel  [place]: q2-4min    Lab Review:  Labs in chart have been reviewed  Hb 10.8

## 2025-06-27 NOTE — SIGNIFICANT EVENT
Labor check    S: Patient experiencing regular contractions, on the birthing ball breathing through them. We discussed AROM as an option in her labor management. We discussed the how AROM could help her labor progress as well as the risks, indications, and contraindications. Patient agreed to let us AROM.     SVE: /-3   AROM for clear fluid  FHT: 130   Moderate variability   Accels present   1 variable decel present   [place]: Q2-3min    A/P: Jd is a 34 y.o.  at 39w1d undergoing rrIOL and TOLAC    IOL  - latent labor  - Pitocin currently at 20, uptitrate per protocol  - GBS positive  - No epidural, plans for no epidural at this point  - Plan to reassess 3-4 hours or sooner if clinically indicated    Fetal Wellbeing  - CEFM, Cat 2 currently     Karoline Aguilar MD

## 2025-06-27 NOTE — SIGNIFICANT EVENT
Labor Check     Pt resting in bed, feeling lower abdominal pressure, Pitocin infusing    FHT: 130, moderate variability, +accel, - decel   [place]: q2-4 min  SVE: 5/70/-3     A/P    TOLAC   - last cervical exam, 5/70%/-3, latent labor, continue to monitor cervical change   -Pitocin 8, titrate per protocol   - Epidural/nubain per patient's request, currently desires NCB  -Cat I tracing  - GBS positive, PCN infusing     Latha Koo MD, PGY-4

## 2025-06-27 NOTE — PROGRESS NOTES
Intrapartum Progress Note    Assessment/Plan   Jd Witt is a 34 y.o.  at 39w1d. LIANET: 7/3/2025, by Last Menstrual Period here for TOLAC and rrIOL    IOL  - TOLAC after successful , h/o cs with twins complicated by PPH   - latent labor  - Pitocin currently at 20, uptitrate per protocol  - GBS positive, PCN infusing  - patient does not desire pain medications for her delivery  - she would like to attempt to SROM  - discussed how she is continuing to make cervical change, but plan to reassess in 3-4 hours     Karoline Aguilar MD  PGY-1, Obstetric and Gynecology    Subjective   Patient was resting comfortably in the room with her mother and . She denies any LOF or vaginal bleeding. She endorse feeling contractions every couple of minutes.     Objective   Last Vitals:  Temp Pulse Resp BP MAP Pulse Ox   36.1 °C (97 °F) 98 18 118/85 98 100 %     Vitals Min/Max Last 24 Hours:  Temp  Min: 36.1 °C (97 °F)  Max: 36.9 °C (98.4 °F)  Pulse  Min: 84  Max: 104  Resp  Min: 14  Max: 18  BP  Min: 95/60  Max: 128/69  MAP (mmHg)  Min: 70  Max: 98    Intake/Output:  No intake or output data in the 24 hours ending 25 0750    Physical Examination:  General: no acute distress  HEENT: normocephalic, atraumatic  Heart: warm and well perfused  Lungs: no increased WOB  Abdomen: gravid  Extremities: moving all extremities  Neuro: awake and conversant  Psych: appropriate mood and affect    SVE: deferred   FHT:   Baseline: 135   Moderate variability   No decelerations   [place]: irregular    Lab Review:  Lab Results   Component Value Date    ABO O 2025    LABRH POS 2025    ABSCRN NEG 2025     Lab Results   Component Value Date    WBC 4.3 (L) 2025    HGB 10.8 (L) 2025    HCT 34.3 (L) 2025     2025     0   Lab Value Date/Time    GRPBSTREP SEE NOTE (A) 2025 1336

## 2025-06-27 NOTE — SIGNIFICANT EVENT
Labor check    S: Patient having regular contractions and experiencing increased pressure. Says she feels like she needs to use the bathroom, but wanted to be checked before using the restroom.    SVE: /-3  FHT:   Baseline 130   Moderate variability   Accelerations present   No decels present   [place]:   Q2-3min     A/P: Jd is a 34 y.o.  at 39w1d undergoing rrIOL and TOLAC    IOL  - latent labor  - Pitocin currently at 20, uptitrate per protocol  - GBS positive  - does not desire epidural at this point in labor course  - plan to reassess in 3-4 or earlier if appropriate    Fetal Wellbeing  - CEFM, Cat I currently     Karoline Aguilar MD

## 2025-06-27 NOTE — SIGNIFICANT EVENT
"  Labor Update  Patient seen and evaluated with Dr. Welch, PGY4  S/p epidural placement - now comfortable, states cannot feel anything.    Per Dr. Welch, concern for change in station on initial exam (as in, higher fetal station), but also with change in cervical dilation.     Vitals reviewed, WNLs - 112/74, 108/69, HR 80s-102  Gen:  patient resting comfortably in bed.    FHT:  140 mod nasrin, +accels, no decels  [place]: Q 5-6 mins  Bladder drained for approximately 800 mL clear urine.    Bedside ultrasound without any significant abnormalities appreciated.    I also checked the cervix with patient consent.  Dilated cervix 6-7 cm, but very \"floppy\" with some length to it,  and not actively laboring cervix.      Discussed findings with Ms. Witt and her support team.    Reviewed concern in change in station, although improved after bladder drained of significant volume.  Cervix not consistent with active phase, dilated, but still not effaced.   Reviewed risks of trial of labor, most notably uterine rupture, which can be difficult to diagnose, but usually not a silent process.  We do take into consideration change in cervical exam (as in, change in fetal station), but no associated vaginal bleeding present, no evidence of fetal distress on CEFM.  Patient was uncomfortable prior to epidural placement, but has had moderate variability and CEFM has been reassuring during the majority of the labor induction process.  Did have oxytocin discontinued for over 1 hour and restrated at 2 mu/min @ 1330, (it had previously reached a dose of 20 mU/min), current dose @ 6 mU/min.      ROM occurred @ 1000.    We reviewed the differences in active vs latent phase.  Reviewed evidence based labor recommendations in terms of when a  would be indicated.    All questions/concerns addressed.    Patient amenable/desires continued augmentation of labor.    Ilsa Rolon MD          "

## 2025-06-27 NOTE — PROGRESS NOTES
Intrapartum Progress Note    Assessment/Plan   Jd Witt is a 34 y.o.  at 39w0d. LIANET: 7/3/2025, by LMP c/w 6wk US presenting for rr TOLAC IOL.     TOLAC IOL  - s/p CRB, SVE: /-2  - continue pitocin per protocol, currently at 4  - T&C x1u in setting of TOLAC  - Epidural available as requested  - PPBC: considering interval nexplanon   - delivery: desires , prev counseled on risks of TOLAC     Maternal comorbidities:   - anxiety and depression - for zoloft postpartum   - hx PPH - T&C as above      Fetal Wellbeing   - CEFM, currently Cat 1  - GBS positive, for PCN   - EFW 7lb by leopolds      Patient seen and discussed with Dr. Toro Calderón MD, PGY-2      Subjective   33yo  at 39w0d by LMP c/w 6wk US (24) who presents for rrIOL.      Pregnancy notable for:   - Anemia: hgb 10.6 ()  - AMA - declined cfDNA   - hx CS x1 - P1 for twins followed by   - Ho PPH - in s/o CS and twins, requiring blood transfusion and B-gallahger, admission Hgb 10.5  - Anxiety and depression - will start Zoloft PP     Objective   Last Vitals:  Temp Pulse Resp BP MAP Pulse Ox   36.5 °C (97.7 °F) 96 18 120/81 95 98 %     Vitals Min/Max Last 24 Hours:  Temp  Min: 36.5 °C (97.7 °F)  Max: 36.5 °C (97.7 °F)  Pulse  Min: 84  Max: 104  Resp  Min: 18  Max: 18  BP  Min: 110/72  Max: 126/81  MAP (mmHg)  Min: 87  Max: 96    Intake/Output:  No intake or output data in the 24 hours ending 25    Physical Examination:  General: Well appearing, alert  HEENT: normocephalic, EOMI, clear sclera  Cardio: Warm and well perfused  Resp: breathing comfortably on room air  Abd: gravid  Neuro: grossly intact, no focal deficits  Extremities: full ROM  Psych: A&O x3, appropriate mood and affect    Chaperone Present: Yes  Chaperone Name/Title: Shawna Cline RN  Examination Chaperoned: Gynecological Exam    FHT: 135/mod/+accel/-decel  [place]: q4-6 mins    Lab Review:  Labs in chart were reviewed.  Lab Results    Component Value Date    WBC 4.3 (L) 06/26/2025    HGB 10.8 (L) 06/26/2025    HCT 34.3 (L) 06/26/2025     06/26/2025

## 2025-06-27 NOTE — SIGNIFICANT EVENT
Labor check    S: MD called to bedside. Patient reports she is in a lot of pain and frustrated that the labor course isn't quicker. Reports last birth was un medicated but she was in her own labor and it was much faster. Currently using nitrous and moving in bed during contractions.     SVE: 6/90/-2  FHT: 135/mod/+accel/1x deep variable decel   [place]: q3-4min prior to pitocin being turned off, now q6min     A/P: Jd is a 34 y.o.  at 39w1d undergoing IOL     IOL  - active labor   - Pitocin currently off due to pain of contractions   - GBS pos, on PCN ppx   - Patient currently using nitrous without much relief. Discussed goals for delivery, desires vaginal delivery. Discussed pain options at this point are epidural, nitrous and movement if not using nitrous. We discussed pain control goals including epidural and answered all her questions. Patient initially expressing desire for , however, after discussion would like to proceed with IOL. After discussion patient desires epidural. Anaesthesia notified.   -Will restart pitocin after placement of epidural.      Fetal Wellbeing  - CEFM, Currently Cat II due to deep variable, however overall reassuring with moderate variability and accelerations, now with recovery to cat 1 tracing      Discussed with Dr. Ольга Galvin MD  PGY2, Obstetrics and Gynecology

## 2025-06-28 PROCEDURE — 2500000001 HC RX 250 WO HCPCS SELF ADMINISTERED DRUGS (ALT 637 FOR MEDICARE OP): Mod: SE

## 2025-06-28 PROCEDURE — 2500000001 HC RX 250 WO HCPCS SELF ADMINISTERED DRUGS (ALT 637 FOR MEDICARE OP): Mod: SE | Performed by: HOSPITALIST

## 2025-06-28 PROCEDURE — 1100000001 HC PRIVATE ROOM DAILY

## 2025-06-28 PROCEDURE — 2500000005 HC RX 250 GENERAL PHARMACY W/O HCPCS: Mod: SE

## 2025-06-28 RX ORDER — SERTRALINE HYDROCHLORIDE 25 MG/1
25 TABLET, FILM COATED ORAL DAILY
Status: DISCONTINUED | OUTPATIENT
Start: 2025-06-28 | End: 2025-06-28

## 2025-06-28 RX ADMIN — WITCH HAZEL 1 EACH: 500 SOLUTION RECTAL; TOPICAL at 05:08

## 2025-06-28 RX ADMIN — ACETAMINOPHEN 975 MG: 325 TABLET ORAL at 18:05

## 2025-06-28 RX ADMIN — ACETAMINOPHEN 975 MG: 325 TABLET ORAL at 11:50

## 2025-06-28 RX ADMIN — MINERAL OIL, PETROLATUM, PHENYLEPHRINE HCL: 2.5; 140; 749 OINTMENT TOPICAL at 18:05

## 2025-06-28 RX ADMIN — WITCH HAZEL 1 EACH: 500 SOLUTION RECTAL; TOPICAL at 16:55

## 2025-06-28 RX ADMIN — IBUPROFEN 600 MG: 600 TABLET ORAL at 11:50

## 2025-06-28 RX ADMIN — ACETAMINOPHEN 975 MG: 325 TABLET ORAL at 05:04

## 2025-06-28 RX ADMIN — IBUPROFEN 600 MG: 600 TABLET ORAL at 18:05

## 2025-06-28 RX ADMIN — IBUPROFEN 600 MG: 600 TABLET ORAL at 05:04

## 2025-06-28 RX ADMIN — BENZOCAINE AND LEVOMENTHOL 1 APPLICATION: 200; 5 SPRAY TOPICAL at 05:08

## 2025-06-28 ASSESSMENT — PAIN SCALES - GENERAL
PAINLEVEL_OUTOF10: 2
PAINLEVEL_OUTOF10: 2
PAINLEVEL_OUTOF10: 5 - MODERATE PAIN

## 2025-06-28 ASSESSMENT — PAIN DESCRIPTION - DESCRIPTORS
DESCRIPTORS: SORE

## 2025-06-28 NOTE — L&D DELIVERY NOTE
Vaginal Delivery Note    Patient Name: Jd Witt  : 1990  MRN: [id number]  Age: 34 y.o.    /Para:   Gestational Age: 39w1d    Date of Delivery: 2025    Procedure: Normal Spontaneous Vaginal Delivery    Delivery Provider: Eduarda Guzmán MD    Resident/Fellow/Other Assistant: Nunu Tesfaye MD     Description of Procedure:  Delivery of viable infant under epidural anesthesia. Pt requested to have epidural turned off during pushing because she wanted to feel more during the process. Delayed clamping was performed. The infant was placed skin to skin. Cord gases were not sent.  Cord blood was collected. Placenta delivered intact and fundus was firm. Straight catheterization was performed for 600cc of urine. Significant amount of trickling was persistent so decision was made to administer rectal cytotec. Bimanual exam was performed and clot was removed from the uterus at the level of fundus and lower uterine segment. On second pass, tone was immediately improved. No appreciable retained products of conception. Trickling stopped after bimanual and tone was again confirmed to be firm.     No lacerations identified. Small abrasion on posterior vagina without need for repair.     Additional Procedures:  None    Findings:   Amniotic fluid Clear, Female infant in Vertex Occiput Anterior presentation, APGARS 9 , 9 .  Birth Weight 3.5 kg.    Complications: None    Quantitative Blood Loss:   QBL 500cc     Blood products:  None.     Uterotonics/Hemostatic Agent: IV Pitocin 30 units and IM Carboprost 0.25 mg    Specimen:   Placenta  Delivered: 2025  8:13 PM  Appearance: Intact  Removal: Spontaneous    Disposition: discarded    Sponge/Instrument/Needle Counts: The sponge, lap and needle counts were correct.    Patient Disposition: Patient recovering on labor and delivery in stable condition.    Betina Wittemilie [[id number]]      Labor Events    Sac identifier: Sac 1  Rupture  date/time: 2025 1000  Rupture type: Artificial  Fluid color: Clear  Fluid odor: None  Labor type: Induced Onset of Labor  Labor allowed to proceed with plans for an attempted vaginal birth?: Yes  Induction: [place]/EASI, Oxytocin, AROM  First cervical ripening date/time: 2025  Induction date/time: 2025  Induction indications: Risk Reducing  Complications: None       Labor Event Times    Labor onset date/time: 2025 1430  Dilation complete date/time: 2025 1932  Start pushing date/time: 2025 19:34       Labor Length    1st stage: 29h 02m  2nd stage: 0h 38m  3rd stage: 0h 03m       Placenta    Placenta delivery date/time: 2025 20:13  Placenta removal: Spontaneous  Placenta appearance: Intact  Placenta disposition: discarded       Cord    Vessels: 3 vessels  Complications: None  Delayed cord clamping?: Yes  Cord clamped date/time: 2025 20:10:00  Cord blood disposition: Lab  Gases sent?: No  Stem cell collection (by provider): No       Lacerations    Episiotomy: None  Perineal laceration: None  Other lacerations?: No  Repair suture: None       Anesthesia    Method: Epidural       Operative Delivery    Forceps attempted?: No  Vacuum extractor attempted?: No       Shoulder Dystocia    Shoulder dystocia present?: No        Delivery    Birth date/time: 2025 20:10:00  Delivery type: Vaginal, Spontaneous  Complications: None       Resuscitation    Method: Suctioning, Tactile stimulation       Apgars    Living status: Living  Apgar Component Scores:  1 min.:  5 min.:  10 min.:  15 min.:  20 min.:    Skin color:  1  1       Heart rate:  2  2       Reflex irritability:  2  2       Muscle tone:  2  2       Respiratory effort:  2  2       Total:  9  9       Apgars assigned by: GIOVANNI DELGADO       Delivery Providers    Delivering clinician: Eduarda Guzmán MD   Provider Role    Nery Horne RN Delivery Nurse    Dihsa Delgado RN Nursery Nurse    Vilma Tesfaye,  MD Resident    Alejandra Osorio MD Resident

## 2025-06-28 NOTE — PROGRESS NOTES
Postpartum Progress Note    Assessment/Plan   Jd Witt is a 34 y.o., , who delivered at 39w1d gestation via Vaginal, Spontaneous after IOL in s/o rr TOLAC    Now PPD#1 s/p Vaginal, Spontaneous on 2025  - Continue routine postpartum care  - Pain well controlled on po medications  - DVT risk score DVT Score (IF A SCORE IS NOT CALCULATING, MUST SELECT A BMI TO COMPLETE): 3 , ppx with SCDs and ambulation  - RH positive, rhogam not indicated  - Hgb:   Results from last 7 days   Lab Units 25  1516   HEMOGLOBIN g/dL 10.8*        Anxiety and Depression  - hx of postpartum depression  - mood stable  - patient declined initiation of zoloft postpartum   - patient considering zurzuvae medication if she deems necessary     Maternal Well-Being  - Vitals stable  - All questions and concerns address    Hope Feeding  - Breastfeeding/pumping encouraged  - Lactation consult prn    Contraception  - For Nexplanon outpatient  - Education provided    Dispo  - Anticipate d/c on PPD #2 if meeting all postpartum milestones  - Follow-up in 4-6wks with primary MARIZOL Freeman PA-C     Assessment & Plan  Encounter for induction of labor    Pregnancy Problems (from 24 to present)       Problem Noted Diagnosed Resolved    Encounter for induction of labor 2025 by Karoline Aguilar MD  No    Priority:  Medium       High risk pregnancy, multigravida of advanced maternal age in third trimester ([facility]) 2025 by Stephanie Christine MD  No    Priority:  Medium       History of postpartum hemorrhage 2025 by MARIEL Sykes  No    Priority:  Medium       Overview Signed 2025  2:26 PM by MARIEL Sykes   History of blood transfusion          Urinary tract infection in mother during second trimester of pregnancy ([facility]) 3/21/2025 by MARIEL Sykes  No    Priority:  Medium       Overview Addendum 2025  8:09 AM by MARIEL Cee   Need  UTI PIERRE [  ]  Collected  contaminated   [x] recollect PIERRE, WNL         History of gestational hypertension 2025 by MARIEL Sykes  No    Priority:  Medium       Overview Signed 2025 10:44 AM by MARIEL Sykes   In previous pregnancy   [  ]bASA         37 weeks gestation of pregnancy ([facility]) 2025 by MARIEL Sykes  No    Priority:  Medium       Overview Addendum 2025  2:45 PM by MARIEL Sykes   Desired provider in labor: [] CNM  [x] Physician  [x] Blood Products: [x] Yes, accepts [] No, needs counseling  [x] Initial BMI: 19.73   [x] Prenatal Labs: done   [x] Cervical Cancer Screening up to date  [x] Rh status:  O+  [x] Genetic Screening:  declined  [x] NT US: (11-13 wks)  [] Baby ASA (if indicated):  [x] Pregnancy dated by: 6w4d    [x] Anatomy US: (19-20 wks)  [] Federal Sterilization consent signed (if indicated):  [x] 1hr GCT at 24-28wks:  [] [] Fetal Surveillance (if indicated): 34w growth wnl efw 84%  [x] Tdap (27-32 wks, may be given up to 36 wks if initial given window missed): given   [] []    [x] Breastfeeding: has PUMP  [x] Postpartum Birth control method: considering PP nexplanon   [x] GBS at 36 - 37 wks: pending   [x] 39 weeks discussion of IOL vs. Expectant management: Vivienne chahal with [facility]  [x] Mode of delivery ( anticipated ):                  Subjective     Jd Witt is PPD#1 s/p vaginal delivery who reports feeling overall well.    Her pain is well controlled with current medications  She is passing flatus  She is ambulating well  She is tolerating a Adult diet Regular  She reports no breast or nursing problems  She denies emotional concerns today        Objective   Allergies:   Patient has no known allergies.         Last Vitals:  Temp Pulse Resp BP MAP Pulse Ox   37.3 °C (99.1 °F) 89 16 106/71   94 %     Vitals Min/Max Last 24 Hours:  Temp  Min: 36 °C (96.8 °F)  Max: 37.3 °C (99.1  °F)  Pulse  Min: 65  Max: 125  Resp  Min: 16  Max: 18  BP  Min: 101/65  Max: 148/87    Intake/Output:     Intake/Output Summary (Last 24 hours) at 6/28/2025 1659  Last data filed at 6/28/2025 0503  Gross per 24 hour   Intake --   Output 3815 ml   Net -3815 ml       Physical Exam:  General: Examination reveals a well developed, well nourished, female, in no acute distress. She is alert and cooperative.  Lungs: symmetrical, non-labored breathing.  Cardiac: warm, well-perfused.  Abdomen: soft, non-tender.  Fundus: firm, below umbilicus, and nontender.  Extremities: no redness or tenderness in the calves or thighs.  Neurological: alert, oriented, normal speech, no focal findings or movement disorder noted.     Lab Data:  Labs in chart were reviewed.  Lab Results   Component Value Date    WBC 4.3 (L) 06/26/2025    HGB 10.8 (L) 06/26/2025    HCT 34.3 (L) 06/26/2025     06/26/2025

## 2025-06-28 NOTE — SIGNIFICANT EVENT
"To bedside for RN concern of boggy uterus     Pt feeling fine, no lightheadedness or dizziness. No concerns at this time.     /79   Pulse 94   Temp 36.7 °C (98.1 °F) (Temporal)   Resp 16   Ht 1.702 m (5' 7\")   Wt 84.8 kg (187 lb)   LMP 09/26/2024 (Approximate)   SpO2 99%   Breastfeeding Unknown   BMI 29.29 kg/m²      Constitutional: No visible distress, alert and cooperative  Eyes: clear sclera  Head/Neck: Normocephalic  Respiratory/Thorax: Normal respiratory effort on RA  Abdomen/GYN: fundus firm, midline and below umbilicus. No trickling noted.  Musculoskeletal: grossly normal ROM  Extremities: BLEs symmetrical   Neurological: A&Ox3  Psychological: Appropriate mood and behavior  Skin: warm, dry, no lesions noted     Discussed with patient given firm fundus at this time, may have some waxing of waning of uterine tone. Discussed with chief resident with lower concern for significant bleeding or RPOC requiring bimanual exam at this time, option to start second pitocin bolus in the s/o hx of PPH in CS for twin delivery. Pt is already s/p cytotec at time of delivery. Pt amenable, discussed with bedside RN to administer. 50cc of additional bleeding since last assessment one hour ago.     Patient discussed with Dr. Devon Tesfaye MD PGY-2   "

## 2025-06-28 NOTE — CARE PLAN
Problem: Vaginal Birth or  Section  Goal: Fetal and maternal status remain reassuring during the birth process  Outcome: Met  Goal: Tolerate CRB for IOL placement maintenance until dislodgement/removal 12hrs after placement  Outcome: Met  Goal: Prevention of malpresentation/labor dystocia through delivery  Outcome: Met  Goal: Demonstrates labor coping techniques through delivery  Outcome: Met  Goal: Minimal s/sx of HDP and BP<160/110  Outcome: Met  Goal: No s/sx of infection through recovery  Outcome: Met  Goal: No s/sx of hemorrhage through recovery  Outcome: Met     Problem: Pain - Adult  Goal: Verbalizes/displays adequate comfort level or baseline comfort level  Outcome: Met     Problem: Safety - Adult  Goal: Free from fall injury  Outcome: Met

## 2025-06-28 NOTE — LACTATION NOTE
Lactation Consultant Note  Lactation Consultation  Reason for Consult: Initial assessment  Consultant Name: EFRAIN Powell RN IBCLC    Maternal Information  Has mother  before?: Yes  How long did the mother previously breastfeed?: 3 other children, including twins, over 1 year; merino 2 years  Previous Maternal Breastfeeding Challenges: None  Infant to breast within first 2 hours of birth?: Yes  Exclusive Pump and Bottle Feed: No    Maternal Assessment  Breast Assessment: Medium, Symmetrical, Soft, Compressible  Nipple Assessment: Intact, Erect, Sore  Areola Assessment: Normal    Infant Assessment  Infant Behavior: Quiet alert, Readiness to feed, Feeding cues observed, Rooting response, Sucking  Infant Assessment: Tongue protrudes over alveolar ridge    Feeding Assessment  Nutrition Source: Breastmilk  Feeding Method: Nursing at the breast  Feeding Position: Cradle, Cross - cradle, Skin to skin, Both sides, Mother needs assistance with latch/positioning  Suck/Feeding: Sustained, Coordinated suck/swallow/breathe, Baby led rhythmically  Latch Assessment: Minimal assistance is needed, Instructed on deep latch, Eagerly grasped on to latch, Areolar attachment, Deep latch obtained, Optimal angle of mouth opening, Chin and lower lip contact breast first, Bursts of sucking, swallowing, and rest, Flanged lips, Chin moves in rhythmic motion, Comfortable latch    LATCH TOOL  Latch: Grasps breast, tongue down, lips flanged, rhythmic sucking  Audible Swallowing: Spontaneous and intermittent (24 hours old)  Type of Nipple: Everted (After stimulation)  Comfort (Breast/Nipple): Soft/non-tender  Hold (Positioning): Minimal assist, teach one side, mother does other, staff holds  LATCH Score: 9    Breast Pump       Other OB Lactation Tools       Patient Follow-up  Inpatient Lactation Follow-up Needed : Yes    Other OB Lactation Documentation  Maternal Risk Factors: Age over 30, primiparity  Infant Risk Factors: Early term  birth 37-39 weeks    Recommendations/Summary    Infant seen at 14 hours of life. The mother has been independently latching her infant to the breast. She denies any difficulty with latching or pain/discomfort for breastfeeding. When I came into the room, the infant was feeding on-and-off. I offered assistance with latching and the mother was receptive. I encouraged the mother to use skin to skin for breastfeeding, she was provided a pillow for support of her infant, and we used a cross-cradle hold for latching, then transitioned to a cradle after the infant was effectively latched and nursing. The mother was educated on the following breastfeeding topics: early  feeding patterns and behaviors, especially in the first 24 hours of life; the benefits of skin to skin for breastfeeding; frequent feeds with the goal of 8-12 feeds/24 hours; the importance of a deep latch for maternal comfort and optimal milk production/transfer; alternating starting breast and allowing the infant to end the feeding; early milk production and released; and the rationale for delaying pumping (unless clinically indicated) and pacifier use until breastfeeding is well-established. The mother was provided with written information on outpatient lactation services. She has a breast pump for home use. All questions were answered and the mother is offered further assistance as needed.

## 2025-06-28 NOTE — CARE PLAN
The patient's goals for the shift include breastfeed and rest    The clinical goals for the shift include WNL vital signs, assessments, and PP bleeding      Problem: Postpartum  Goal: Experiences normal postpartum course  Outcome: Progressing  Goal: Appropriate maternal -  bonding  Outcome: Progressing  Goal: Establish and maintain infant feeding pattern for adequate nutrition  Outcome: Progressing  Goal: Incisions, wounds, or drain sites healing without S/S of infection  Outcome: Progressing  Goal: No s/sx infection  Outcome: Progressing  Goal: No s/sx of hemorrhage  Outcome: Progressing  Goal: Minimal s/sx of HDP and BP<160/110  Outcome: Progressing     Problem: Pain - Adult  Goal: Verbalizes/displays adequate comfort level or baseline comfort level  Outcome: Progressing     Problem: Safety - Adult  Goal: Free from fall injury  Outcome: Progressing    Vital signs stable throughout the shift, lochia has been WNL, patient is without s/s of HDP. Patient is bonding well with her !

## 2025-06-28 NOTE — SIGNIFICANT EVENT
"Call by bedside RN for 110cc of bleeding on pad at 2 hour postpartum ochoa     Pt is doing well without any concerns or complaints at this time. Not feeling light headed or dizzy. Baby is breast feeding.     /64   Pulse 83   Temp 36.5 °C (97.7 °F) (Temporal)   Resp 17   Ht 1.702 m (5' 7\")   Wt 84.8 kg (187 lb)   LMP 09/26/2024 (Approximate)   SpO2 100%   BMI 29.29 kg/m²      Constitutional: No visible distress, alert and cooperative  Eyes: clear sclera  Head/Neck: Normocephalic  Respiratory/Thorax: Normal respiratory effort on RA, baby on R breast  Abdominal/GYN: uterus deviated to pt R, firm and below level of umbilicus. No trickling noted on palpation. After straight cath, uterus firm, midline and below umbilicus without trickling on palpation.  Musculoskeletal: grossly normal ROM  Extremities: BLEs symmetrical   Neurological: A&Ox3  Psychological: Appropriate mood and behavior  Skin: warm, dry, no lesions noted     Discussed with patient given that uterine deviation resolved after straight catheterization for an additional 400cc and there is no trickling noted on palpation, no need for additional bimanual exam or uterotonic medications at this time. Pt agreeable at this time and has low concern regarding her bleeding.     Patient discussed with Dr. Ramirez Tesfaye MD PGY-2   "

## 2025-06-29 VITALS
SYSTOLIC BLOOD PRESSURE: 126 MMHG | DIASTOLIC BLOOD PRESSURE: 78 MMHG | HEART RATE: 76 BPM | RESPIRATION RATE: 18 BRPM | WEIGHT: 187 LBS | TEMPERATURE: 97.7 F | BODY MASS INDEX: 29.35 KG/M2 | OXYGEN SATURATION: 97 % | HEIGHT: 67 IN

## 2025-06-29 LAB
BLOOD EXPIRATION DATE: NORMAL
DISPENSE STATUS: NORMAL
PRODUCT BLOOD TYPE: 5100
PRODUCT CODE: NORMAL
UNIT ABO: NORMAL
UNIT NUMBER: NORMAL
UNIT RH: NORMAL
UNIT VOLUME: 350
XM INTEP: NORMAL

## 2025-06-29 PROCEDURE — 2500000001 HC RX 250 WO HCPCS SELF ADMINISTERED DRUGS (ALT 637 FOR MEDICARE OP): Mod: SE

## 2025-06-29 RX ORDER — ACETAMINOPHEN 325 MG/1
975 TABLET ORAL EVERY 6 HOURS
Qty: 360 TABLET | Refills: 0 | Status: SHIPPED | OUTPATIENT
Start: 2025-06-29

## 2025-06-29 RX ORDER — POLYETHYLENE GLYCOL 3350 17 G/17G
17 POWDER, FOR SOLUTION ORAL 2 TIMES DAILY PRN
Qty: 238 G | Refills: 0 | Status: SHIPPED | OUTPATIENT
Start: 2025-06-29

## 2025-06-29 RX ORDER — IBUPROFEN 600 MG/1
600 TABLET, FILM COATED ORAL EVERY 6 HOURS
Qty: 120 TABLET | Refills: 0 | Status: SHIPPED | OUTPATIENT
Start: 2025-06-29

## 2025-06-29 RX ADMIN — IBUPROFEN 600 MG: 600 TABLET ORAL at 00:02

## 2025-06-29 RX ADMIN — ACETAMINOPHEN 975 MG: 325 TABLET ORAL at 00:02

## 2025-06-29 RX ADMIN — IBUPROFEN 600 MG: 600 TABLET ORAL at 06:07

## 2025-06-29 RX ADMIN — ACETAMINOPHEN 975 MG: 325 TABLET ORAL at 06:07

## 2025-06-29 RX ADMIN — MINERAL OIL, PETROLATUM, PHENYLEPHRINE HCL: 2.5; 140; 749 OINTMENT TOPICAL at 05:52

## 2025-06-29 ASSESSMENT — PAIN SCALES - GENERAL: PAINLEVEL_OUTOF10: 2

## 2025-06-29 ASSESSMENT — PAIN DESCRIPTION - DESCRIPTORS: DESCRIPTORS: SORE

## 2025-06-29 NOTE — DISCHARGE SUMMARY
Postpartum Discharge Summary    Admission Date: 2025  Discharge Date: 25     Discharge Diagnosis  Problem List Items Addressed This Visit       * (Principal) Encounter for induction of labor    Relevant Orders    Labor Induction (Completed)     Other Visit Diagnoses         Vaginal delivery ([facility]    -  Primary    Relevant Medications    acetaminophen (Tylenol) 325 mg tablet    ibuprofen 600 mg tablet    polyethylene glycol (Glycolax, Miralax) 17 gram/dose powder             Jd Witt is a 34 y.o. female now  and postpartum day 2      Pregnancy notable for:  - Anemia: hgb 10.6 ()  - AMA - declined cfDNA   - hx CS x1 - P1 for twins followed by   - [facility] - in s/o CS and twins, requiring blood transfusion and B-gallagher, admission Hgb 10.5  - Anxiety and depression - no meds     Hospital Course  Admitted for rrIOL, TOLAC  Delivery Date: 2025 8:10 PM  Delivery type: Vaginal, Spontaneous   GA at delivery: 39w1d  Outcome: Living  Anesthesia during delivery: Epidural  Intrapartum complications: None  Feeding method: Breastfeeding Status: Yes     Delivery complications: , no tears. Cytotec given and second pit bolus  Contraception at discharge: none  - plans interval Nexplanon and partner vasectomy  - Defers contraception to primary OB/PP visit. We discussed pregnancy spacing of at least one year, abstaining from intercourse for 6wks, and the ability to become pregnant in the absence of regular menses. Pt verbalized understanding.  - Encouraged to continue PNV    Complications Requiring Follow-Up    Elevated BP without Dx  - one mild range on  while breastfeeding  - normotensive postpartum  - asymptomatic  - does not meet criteria for HDP at this time    Anxiety and Depression  - hx of postpartum depression  - mood stable  - patient declined initiation of zoloft postpartum   - patient considering zurzuvae medication if she deems necessary   - SW cleared pt for  discharge     Pertinent Subjective and Physical Exam At Time of Discharge  Patient seen at bedside - doing well and no acute events overnight. Pain well-controlled on current regimen, lochia light, voiding spontaneously, passing flatus, +BM, ambulating independently, and tolerating PO. Denies HA, N/V, RUQ pain, vision changes, chest pain, or SOB. Denies dizziness/lightheadedness/LOC/uncontrolled bleeding.    Physical Exam  Constitutional:       Appearance: Normal appearance. She is not ill-appearing.   HENT:      Head: Normocephalic and atraumatic.      Mouth/Throat:      Mouth: Mucous membranes are moist.   Cardiovascular:      Rate and Rhythm: Normal rate and regular rhythm.      Heart sounds: Normal heart sounds. No murmur heard.  Pulmonary:      Effort: Pulmonary effort is normal.      Breath sounds: Normal breath sounds.   Abdominal:      Palpations: Abdomen is soft.      Comments: soft, non-tender, fundus firm below umbilicus   Musculoskeletal:         General: Normal range of motion.      Cervical back: Normal range of motion.      Right lower leg: No edema.      Left lower leg: No edema.   Skin:     General: Skin is warm and dry.   Neurological:      Mental Status: She is alert and oriented to person, place, and time.   Psychiatric:         Behavior: Behavior normal.       Discharge Meds     Your medication list        START taking these medications        Instructions Last Dose Given Next Dose Due   acetaminophen 325 mg tablet  Commonly known as: Tylenol      Take 3 tablets (975 mg) by mouth every 6 hours.       ibuprofen 600 mg tablet      Take 1 tablet (600 mg) by mouth every 6 hours.       polyethylene glycol 17 gram/dose powder  Commonly known as: Glycolax, Miralax      Mix 17 g of powder and drink 2 times a day as needed for constipation.              CONTINUE taking these medications        Instructions Last Dose Given Next Dose Due   docusate sodium 100 mg capsule  Commonly known as: Colace      Take  1 capsule (100 mg) by mouth 2 times a day.       prenatal vitamin (iron-folic) 27 mg iron-800 mcg folic acid tablet      Take 1 tablet by mouth once daily.              STOP taking these medications      aspirin 81 mg EC tablet                  Where to Get Your Medications        These medications were sent to [facility]/pharmacy #[id number] - [place], [place] - [address]  [address], [place] [address]      Hours: 24-hours Phone: [phone]   acetaminophen 325 mg tablet  ibuprofen 600 mg tablet  polyethylene glycol 17 gram/dose powder          Test Results Pending At Discharge  Pending Labs       No current pending labs.          Outpatient Follow-Up  Future Appointments   Date Time Provider Department Center   7/2/2025 11:00 AM MD Dhara [id number]   7/9/2025  2:30 PM MD Dhara [id number]     Patient instructed to call to schedule  4-6 weeks for routine postpartum visit  with Dr. Christine  - Anticipate DC PPD3 pending BP control.  - The signs and symptoms of PEC were reviewed with the patient, including unrelenting headache, vision changes/blurred vision, and pain underneath the right breast.   - BP cuff for home for checking BP BID. Pt instructed to call primary OB if SBP > 160 or DBP > 110.  - On discharge, follow up with primary OB in 4-6 weeks for postpartum visit.    I spent 20 mins in the overall care of this patient.    SIMRAN Bernardo-CNP

## 2025-06-29 NOTE — LACTATION NOTE
Lactation Consultant Note  Lactation Consultation  Reason for Consult: Follow-up assessment  Consultant Name: Anna Pimentel RN IBCLC    Maternal Information  Has mother  before?: Yes  Infant to breast within first 2 hours of birth?: Yes    Maternal Assessment  Breast Assessment: Medium, Symmetrical, Compressible, Soft (expressible)  Nipple Assessment: Intact, Erect  Areola Assessment: Normal    Infant Assessment  Infant Behavior: Deep sleep  Infant Assessment:  (deferred,  at bedside taking photos)    Feeding Assessment  Nutrition Source: Breastmilk  Feeding Method: Nursing at the breast  Unable to assess infant feeding at this time:  ( at bedside)      Patient Follow-up  Outpatient Lactation Follow-up: Recommended    Other OB Lactation Documentation  Maternal Risk Factors: Age over 30, primiparity    Recommendations/Summary  Mother asked to see lactation, was requesting to be measured for flange size at home. I measured mother's nipples at 16mm bilat and instructed her how to obtain the appropriate size flange or inserts for her pump. Mother stated infant has been breastfeeding well however she is concerned infant is not getting enough from her because she has been clusterfeeding. We discussed typical  feeding behaviors and patterns, as well as typical milk productions patterns, and that infants may clusterfeed at times in order to stimulate mother's milk production. I reviewed characteristics and benefits of a deep and proper latch and mother denied any nipple pain with feeding, stated her nipple looks longer and rounded after feedings.     I educated mother on feeding infant based on feeding cues every 2-3 hours, but educated mother that at times infant may feed more frequently. We discussed feeding infant on first breast until they unlatch or until tactile stimulation is not keeping them sucking/swallowing at breast. I then recommended burping infant and then trying to  latch/feed infant on other breast. I encouraged mother to breast feed skin to skin. I reviewed stimulation techniques and breast compression to keep infant sucking and swallowing at the breast, as well as signs of an effective feeding.    Mother has a breast pump for home use and has been provided with outpatient lactation resources. I encouraged her to call for a latch check with infant's next feeding if needed/desired.

## 2025-06-29 NOTE — CARE PLAN
The patient's goals for the shift include prepare for discharge home    The clinical goals for the shift include WNL vital signs and assessments      Problem: Postpartum  Goal: Experiences normal postpartum course  Outcome: Met  Goal: Appropriate maternal -  bonding  Outcome: Met  Goal: Establish and maintain infant feeding pattern for adequate nutrition  Outcome: Met  Goal: Incisions, wounds, or drain sites healing without S/S of infection  Outcome: Met  Goal: No s/sx infection  Outcome: Met  Goal: No s/sx of hemorrhage  Outcome: Met  Goal: Minimal s/sx of HDP and BP<160/110  Outcome: Met     Problem: Pain - Adult  Goal: Verbalizes/displays adequate comfort level or baseline comfort level  Outcome: Met     Problem: Safety - Adult  Goal: Free from fall injury  Outcome: Met    Vital signs stable throughout the shift, lochia has been WNL, patient is without s/s of HDP. Patient is bonding well with her !

## 2025-06-29 NOTE — CARE PLAN
Problem: Postpartum  Goal: Experiences normal postpartum course  Outcome: Progressing  Goal: Appropriate maternal -  bonding  Outcome: Progressing  Goal: Establish and maintain infant feeding pattern for adequate nutrition  Outcome: Progressing  Goal: Incisions, wounds, or drain sites healing without S/S of infection  Outcome: Progressing  Goal: No s/sx infection  Outcome: Progressing  Goal: No s/sx of hemorrhage  Outcome: Progressing  Goal: Minimal s/sx of HDP and BP<160/110  Outcome: Progressing     Problem: Pain - Adult  Goal: Verbalizes/displays adequate comfort level or baseline comfort level  Outcome: Progressing     Problem: Safety - Adult  Goal: Free from fall injury  Outcome: Progressing     VSS, bleeding and swelling minimal, pain controlled with medications, breastfeeding.

## 2025-06-29 NOTE — PROGRESS NOTES
KETTY met with Ms. Witt. Also present was FOB. Parents report that they have all the necessary baby supplies and are familiar with community resources. Ms. Witt stated that she participated in the [facility]regnancy program. She says she has family nearby that are supportive. Both she and FOB will have some time off of work during the post partum period. KETTY discussed post partum depression. Ms Witt denied any current symptoms of depression and stated that she has been in counseling to take care of her mental health. Parents had no other questions or concerns. Clear for discharge when medically ready.  HECTOR Mcmillan

## 2025-07-01 ENCOUNTER — PATIENT MESSAGE (OUTPATIENT)
Dept: OBSTETRICS AND GYNECOLOGY | Facility: CLINIC | Age: 35
End: 2025-07-01
Payer: COMMERCIAL

## 2025-07-01 DIAGNOSIS — B96.89 BV (BACTERIAL VAGINOSIS): ICD-10-CM

## 2025-07-01 DIAGNOSIS — N76.0 BV (BACTERIAL VAGINOSIS): ICD-10-CM

## 2025-07-02 ENCOUNTER — TELEPHONE (OUTPATIENT)
Dept: OBSTETRICS AND GYNECOLOGY | Facility: CLINIC | Age: 35
End: 2025-07-02

## 2025-07-02 ENCOUNTER — APPOINTMENT (OUTPATIENT)
Dept: OBSTETRICS AND GYNECOLOGY | Facility: CLINIC | Age: 35
End: 2025-07-02
Payer: COMMERCIAL

## 2025-07-02 NOTE — TELEPHONE ENCOUNTER
----- Message from Stephanie Christine sent at 6/30/2025 11:49 PM EDT -----  Delivered! Patient may have rx if she so desired.  ----- Message -----  From: Cierra, Vyu Results In  Sent: 6/26/2025   6:26 PM EDT  To: Stephanie Christine MD

## 2025-07-02 NOTE — TELEPHONE ENCOUNTER
RN called patient to discuss results  Patient originally answered but hung up  RN will follow up    Pepper Marc BSN, RN

## 2025-07-09 ENCOUNTER — APPOINTMENT (OUTPATIENT)
Dept: OBSTETRICS AND GYNECOLOGY | Facility: CLINIC | Age: 35
End: 2025-07-09
Payer: COMMERCIAL

## 2025-07-09 RX ORDER — METRONIDAZOLE 7.5 MG/G
GEL VAGINAL DAILY
Qty: 70 G | Refills: 0 | Status: SHIPPED | OUTPATIENT
Start: 2025-07-09 | End: 2025-07-14

## 2025-07-11 ENCOUNTER — APPOINTMENT (OUTPATIENT)
Dept: OBSTETRICS AND GYNECOLOGY | Facility: CLINIC | Age: 35
End: 2025-07-11
Payer: COMMERCIAL

## 2025-08-07 ENCOUNTER — APPOINTMENT (OUTPATIENT)
Dept: OBSTETRICS AND GYNECOLOGY | Facility: CLINIC | Age: 35
End: 2025-08-07
Payer: COMMERCIAL

## 2025-08-07 VITALS
WEIGHT: 153 LBS | SYSTOLIC BLOOD PRESSURE: 102 MMHG | HEIGHT: 67 IN | BODY MASS INDEX: 24.01 KG/M2 | DIASTOLIC BLOOD PRESSURE: 60 MMHG

## 2025-08-07 DIAGNOSIS — Z30.011 ENCOUNTER FOR INITIAL PRESCRIPTION OF CONTRACEPTIVE PILLS: ICD-10-CM

## 2025-08-07 DIAGNOSIS — Z32.01 PREGNANCY TEST POSITIVE (HHS-HCC): ICD-10-CM

## 2025-08-07 RX ORDER — NORETHINDRONE 0.35 MG/1
1 TABLET ORAL DAILY
Qty: 84 TABLET | Refills: 3 | Status: SHIPPED | OUTPATIENT
Start: 2025-08-07 | End: 2026-08-07

## 2025-08-07 ASSESSMENT — EDINBURGH POSTNATAL DEPRESSION SCALE (EPDS)
TOTAL SCORE: 9
THINGS HAVE BEEN GETTING ON TOP OF ME: YES, SOMETIMES I HAVEN'T BEEN COPING AS WELL AS USUAL
I HAVE FELT SAD OR MISERABLE: NO, NOT AT ALL
I HAVE BEEN ANXIOUS OR WORRIED FOR NO GOOD REASON: YES, VERY OFTEN
I HAVE FELT SCARED OR PANICKY FOR NO GOOD REASON: NO, NOT MUCH
I HAVE BLAMED MYSELF UNNECESSARILY WHEN THINGS WENT WRONG: NOT VERY OFTEN
THE THOUGHT OF HARMING MYSELF HAS OCCURRED TO ME: NEVER
I HAVE BEEN ABLE TO LAUGH AND SEE THE FUNNY SIDE OF THINGS: AS MUCH AS I ALWAYS COULD
I HAVE BEEN SO UNHAPPY THAT I HAVE BEEN CRYING: ONLY OCCASIONALLY
I HAVE BEEN SO UNHAPPY THAT I HAVE HAD DIFFICULTY SLEEPING: NOT AT ALL
I HAVE LOOKED FORWARD WITH ENJOYMENT TO THINGS: RATHER LESS THAN I USED TO

## 2025-08-07 ASSESSMENT — ENCOUNTER SYMPTOMS
FATIGUE: 1
HEADACHES: 0

## 2025-08-07 ASSESSMENT — PAIN SCALES - GENERAL: PAINLEVEL_OUTOF10: 0-NO PAIN

## 2025-08-07 NOTE — PATIENT INSTRUCTIONS
Congratulations on the birth of your beautiful baby girl!    Review the postpartum information provided today.    A refill of your prenatal vitamin has been sent to the pharmacy.  Take 1 daily while breast-feeding.    A prescription for progesterone only birth control pill has been sent to the pharmacy.  You may start the pill on Sunday.  Remember to take the pill at the same time daily.      You can wait return to the office in 3 to 4 months for your annual GYN exam.    Feel free to call the office with any problems, questions or concerns prior to your next scheduled visit.

## 2025-08-08 NOTE — PROGRESS NOTES
Assessment and Plan:  Jd Witt is a 36 y/o  woman presenting today approximately 6 weeks s/p  for a postpartum visit.    Diagnoses:  #1 Routine postpartum follow-up  #2 Care and examination of lactating mother  #3 Initial prescription of contraceptive pills    Assessment/Plan:  1. Normal 6 week postpartum visit  - Return to the office in 3-4 months for annual GYN exam.  - Postpartum information pack provided.  - Safe sleep information given.  - Prenatal vitamin prescription refilled.  - Prescription for Micronor birth control pills sent to pharmacy. She may start on .  - Follow up with PCP, behavioral health, etc PRN.  - Postpartum gift box and bag provided.    Follow up with Dr. Christine.    Scribe Attestation  By signing my name below, I, Alisha Jackson, attest that this documentation has been prepared under the direction and in the presence of Stephanie Christine MD on 2025 at 10:30 PM.     HPI:   Jd Witt is a 36 y/o  woman presenting today approximately 6 weeks s/p  for a postpartum visit.    She describes her vaginal bleeding as fluctuating, with episodes of bright red blood and old blood. She reports back pain and fatigue. She has experienced an improvement in her mood and urinary frequency. She denies headaches or visual disturbances    She is currently breastfeeding. She has had issues with engorgement and clogged ducts. She also reports intermittent breast pain.    She is interested in using birth control at this time.    ROS:  Review of Systems   Constitutional:  Positive for fatigue.   Eyes:  Negative for visual disturbance.   Genitourinary:  Positive for vaginal bleeding.        Positive for breast tenderness.   Neurological:  Negative for headaches.     Physical Exam:   Physical Exam  Constitutional:       General: She is not in acute distress.     Appearance: Normal appearance. She is normal weight.   Genitourinary:      Vulva exam comments: Normal  appearing external female genitalia, normal Bartholin's and Leon's glands bilaterally  .      Vaginal exam comments: Moist, rugated. She has a tampon in and prefers not to have an exam today..   Abdominal:      Palpations: Abdomen is soft.      Tenderness: There is no abdominal tenderness.     Neurological:      Mental Status: She is alert and oriented to person, place, and time.      Comments: Pleasant and cooperative

## 2025-11-18 ENCOUNTER — APPOINTMENT (OUTPATIENT)
Dept: OBSTETRICS AND GYNECOLOGY | Facility: CLINIC | Age: 35
End: 2025-11-18
Payer: COMMERCIAL